# Patient Record
Sex: MALE | Race: OTHER | HISPANIC OR LATINO | Employment: OTHER | URBAN - METROPOLITAN AREA
[De-identification: names, ages, dates, MRNs, and addresses within clinical notes are randomized per-mention and may not be internally consistent; named-entity substitution may affect disease eponyms.]

---

## 2023-09-06 ENCOUNTER — ANESTHESIA EVENT (OUTPATIENT)
Dept: SURGERY | Facility: HOSPITAL | Age: 73
End: 2023-09-06

## 2023-09-06 ENCOUNTER — HOSPITAL ENCOUNTER (OUTPATIENT)
Facility: HOSPITAL | Age: 73
Discharge: HOME | End: 2023-09-09
Attending: EMERGENCY MEDICINE | Admitting: SURGERY

## 2023-09-06 DIAGNOSIS — K81.0 ACUTE CHOLECYSTITIS: Primary | ICD-10-CM

## 2023-09-06 DIAGNOSIS — R11.0 NAUSEA: ICD-10-CM

## 2023-09-06 DIAGNOSIS — R10.11 RIGHT UPPER QUADRANT ABDOMINAL PAIN: ICD-10-CM

## 2023-09-06 LAB
ALBUMIN SERPL-MCNC: 3.3 G/DL (ref 3.4–4.8)
ALBUMIN/GLOB SERPL: 0.9 RATIO (ref 1.1–2)
ALP SERPL-CCNC: 134 UNIT/L (ref 40–150)
ALT SERPL-CCNC: 22 UNIT/L (ref 0–55)
APPEARANCE UR: CLEAR
APTT PPP: 33.8 SECONDS (ref 23.4–33.9)
AST SERPL-CCNC: 23 UNIT/L (ref 5–34)
BACTERIA #/AREA URNS AUTO: NORMAL /HPF
BASOPHILS # BLD AUTO: 0.05 X10(3)/MCL
BASOPHILS NFR BLD AUTO: 0.2 %
BILIRUB SERPL-MCNC: 2.1 MG/DL
BILIRUB UR QL STRIP.AUTO: NEGATIVE
BUN SERPL-MCNC: 13.5 MG/DL (ref 8.4–25.7)
CALCIUM SERPL-MCNC: 9.1 MG/DL (ref 8.8–10)
CHLORIDE SERPL-SCNC: 94 MMOL/L (ref 98–107)
CO2 SERPL-SCNC: 28 MMOL/L (ref 23–31)
COLOR UR: YELLOW
CREAT SERPL-MCNC: 0.97 MG/DL (ref 0.73–1.18)
EOSINOPHIL # BLD AUTO: 0.01 X10(3)/MCL (ref 0–0.9)
EOSINOPHIL NFR BLD AUTO: 0 %
ERYTHROCYTE [DISTWIDTH] IN BLOOD BY AUTOMATED COUNT: 11.5 % (ref 11.5–17)
GFR SERPLBLD CREATININE-BSD FMLA CKD-EPI: >60 MLS/MIN/1.73/M2
GLOBULIN SER-MCNC: 3.7 GM/DL (ref 2.4–3.5)
GLUCOSE SERPL-MCNC: 223 MG/DL (ref 82–115)
GLUCOSE UR QL STRIP.AUTO: 100
GROUP & RH: NORMAL
HCT VFR BLD AUTO: 42.9 % (ref 42–52)
HGB BLD-MCNC: 15.4 G/DL (ref 14–18)
IMM GRANULOCYTES # BLD AUTO: 0.15 X10(3)/MCL (ref 0–0.04)
IMM GRANULOCYTES NFR BLD AUTO: 0.7 %
INDIRECT COOMBS GEL: NORMAL
INR PPP: 1.2 (ref 2–3)
KETONES UR QL STRIP.AUTO: 15
LACTATE SERPL-SCNC: 1.3 MMOL/L (ref 0.5–2.2)
LEUKOCYTE ESTERASE UR QL STRIP.AUTO: NEGATIVE
LIPASE SERPL-CCNC: 14 U/L
LYMPHOCYTES # BLD AUTO: 1.53 X10(3)/MCL (ref 0.6–4.6)
LYMPHOCYTES NFR BLD AUTO: 6.7 %
MCH RBC QN AUTO: 30.9 PG (ref 27–31)
MCHC RBC AUTO-ENTMCNC: 35.9 G/DL (ref 33–36)
MCV RBC AUTO: 86 FL (ref 80–94)
MONOCYTES # BLD AUTO: 1.99 X10(3)/MCL (ref 0.1–1.3)
MONOCYTES NFR BLD AUTO: 8.7 %
NEUTROPHILS # BLD AUTO: 19.07 X10(3)/MCL (ref 2.1–9.2)
NEUTROPHILS NFR BLD AUTO: 83.7 %
NITRITE UR QL STRIP.AUTO: NEGATIVE
NRBC BLD AUTO-RTO: 0 %
PH UR STRIP.AUTO: 5.5 [PH]
PLATELET # BLD AUTO: 292 X10(3)/MCL (ref 130–400)
PMV BLD AUTO: 9.9 FL (ref 7.4–10.4)
POCT GLUCOSE: 210 MG/DL (ref 70–110)
POCT GLUCOSE: 248 MG/DL (ref 70–110)
POCT GLUCOSE: 312 MG/DL (ref 70–110)
POTASSIUM SERPL-SCNC: 4.1 MMOL/L (ref 3.5–5.1)
PROT SERPL-MCNC: 7 GM/DL (ref 5.8–7.6)
PROT UR QL STRIP.AUTO: 30
PROTHROMBIN TIME: 15.1 SECONDS (ref 11.7–14.5)
RBC # BLD AUTO: 4.99 X10(6)/MCL (ref 4.7–6.1)
RBC #/AREA URNS AUTO: NORMAL /HPF
RBC UR QL AUTO: ABNORMAL
SODIUM SERPL-SCNC: 131 MMOL/L (ref 136–145)
SP GR UR STRIP.AUTO: <=1.005 (ref 1–1.03)
SPECIMEN OUTDATE: NORMAL
SQUAMOUS #/AREA URNS AUTO: NORMAL /HPF
TROPONIN I SERPL-MCNC: <0.01 NG/ML (ref 0–0.04)
UROBILINOGEN UR STRIP-ACNC: >=8
WBC # SPEC AUTO: 22.8 X10(3)/MCL (ref 4.5–11.5)
WBC #/AREA URNS AUTO: NORMAL /HPF

## 2023-09-06 PROCEDURE — 82962 GLUCOSE BLOOD TEST: CPT

## 2023-09-06 PROCEDURE — G0378 HOSPITAL OBSERVATION PER HR: HCPCS

## 2023-09-06 PROCEDURE — 93005 ELECTROCARDIOGRAM TRACING: CPT

## 2023-09-06 PROCEDURE — 12000002 HC ACUTE/MED SURGE SEMI-PRIVATE ROOM

## 2023-09-06 PROCEDURE — 96376 TX/PRO/DX INJ SAME DRUG ADON: CPT

## 2023-09-06 PROCEDURE — 25000003 PHARM REV CODE 250: Performed by: EMERGENCY MEDICINE

## 2023-09-06 PROCEDURE — 63600175 PHARM REV CODE 636 W HCPCS: Performed by: EMERGENCY MEDICINE

## 2023-09-06 PROCEDURE — 96372 THER/PROPH/DIAG INJ SC/IM: CPT | Mod: 59

## 2023-09-06 PROCEDURE — 63600175 PHARM REV CODE 636 W HCPCS

## 2023-09-06 PROCEDURE — 84484 ASSAY OF TROPONIN QUANT: CPT | Performed by: EMERGENCY MEDICINE

## 2023-09-06 PROCEDURE — 25000003 PHARM REV CODE 250

## 2023-09-06 PROCEDURE — 85610 PROTHROMBIN TIME: CPT | Performed by: EMERGENCY MEDICINE

## 2023-09-06 PROCEDURE — 99285 EMERGENCY DEPT VISIT HI MDM: CPT | Mod: 25

## 2023-09-06 PROCEDURE — 83690 ASSAY OF LIPASE: CPT | Performed by: EMERGENCY MEDICINE

## 2023-09-06 PROCEDURE — 93010 ELECTROCARDIOGRAM REPORT: CPT | Mod: ,,, | Performed by: INTERNAL MEDICINE

## 2023-09-06 PROCEDURE — 83605 ASSAY OF LACTIC ACID: CPT | Performed by: EMERGENCY MEDICINE

## 2023-09-06 PROCEDURE — 85025 COMPLETE CBC W/AUTO DIFF WBC: CPT | Performed by: EMERGENCY MEDICINE

## 2023-09-06 PROCEDURE — 96365 THER/PROPH/DIAG IV INF INIT: CPT

## 2023-09-06 PROCEDURE — 86900 BLOOD TYPING SEROLOGIC ABO: CPT | Performed by: SURGERY

## 2023-09-06 PROCEDURE — 96375 TX/PRO/DX INJ NEW DRUG ADDON: CPT

## 2023-09-06 PROCEDURE — 93010 PR ELECTROCARDIOGRAM REPORT: ICD-10-PCS | Mod: ,,, | Performed by: INTERNAL MEDICINE

## 2023-09-06 PROCEDURE — 81001 URINALYSIS AUTO W/SCOPE: CPT | Performed by: EMERGENCY MEDICINE

## 2023-09-06 PROCEDURE — 25500020 PHARM REV CODE 255: Performed by: EMERGENCY MEDICINE

## 2023-09-06 PROCEDURE — 87040 BLOOD CULTURE FOR BACTERIA: CPT | Performed by: EMERGENCY MEDICINE

## 2023-09-06 PROCEDURE — 80053 COMPREHEN METABOLIC PANEL: CPT | Performed by: EMERGENCY MEDICINE

## 2023-09-06 PROCEDURE — 85730 THROMBOPLASTIN TIME PARTIAL: CPT | Performed by: EMERGENCY MEDICINE

## 2023-09-06 RX ORDER — SODIUM CHLORIDE 0.9 % (FLUSH) 0.9 %
10 SYRINGE (ML) INJECTION
Status: DISCONTINUED | OUTPATIENT
Start: 2023-09-06 | End: 2023-09-09 | Stop reason: HOSPADM

## 2023-09-06 RX ORDER — TALC
6 POWDER (GRAM) TOPICAL NIGHTLY PRN
Status: DISCONTINUED | OUTPATIENT
Start: 2023-09-06 | End: 2023-09-09 | Stop reason: HOSPADM

## 2023-09-06 RX ORDER — ONDANSETRON 4 MG/1
8 TABLET, ORALLY DISINTEGRATING ORAL EVERY 8 HOURS PRN
Status: DISCONTINUED | OUTPATIENT
Start: 2023-09-06 | End: 2023-09-09 | Stop reason: HOSPADM

## 2023-09-06 RX ORDER — ONDANSETRON 2 MG/ML
4 INJECTION INTRAMUSCULAR; INTRAVENOUS EVERY 6 HOURS PRN
Status: DISCONTINUED | OUTPATIENT
Start: 2023-09-06 | End: 2023-09-06

## 2023-09-06 RX ORDER — LIDOCAINE HYDROCHLORIDE 10 MG/ML
1 INJECTION, SOLUTION EPIDURAL; INFILTRATION; INTRACAUDAL; PERINEURAL ONCE AS NEEDED
Status: DISCONTINUED | OUTPATIENT
Start: 2023-09-06 | End: 2023-09-09 | Stop reason: HOSPADM

## 2023-09-06 RX ORDER — HEPARIN SODIUM 5000 [USP'U]/ML
5000 INJECTION, SOLUTION INTRAVENOUS; SUBCUTANEOUS ONCE
Status: COMPLETED | OUTPATIENT
Start: 2023-09-07 | End: 2023-09-07

## 2023-09-06 RX ORDER — INSULIN ASPART 100 [IU]/ML
0-5 INJECTION, SOLUTION INTRAVENOUS; SUBCUTANEOUS
Status: DISCONTINUED | OUTPATIENT
Start: 2023-09-06 | End: 2023-09-07

## 2023-09-06 RX ORDER — SODIUM CHLORIDE, SODIUM LACTATE, POTASSIUM CHLORIDE, CALCIUM CHLORIDE 600; 310; 30; 20 MG/100ML; MG/100ML; MG/100ML; MG/100ML
INJECTION, SOLUTION INTRAVENOUS CONTINUOUS
Status: DISCONTINUED | OUTPATIENT
Start: 2023-09-06 | End: 2023-09-07

## 2023-09-06 RX ORDER — MORPHINE SULFATE 4 MG/ML
4 INJECTION, SOLUTION INTRAMUSCULAR; INTRAVENOUS
Status: COMPLETED | OUTPATIENT
Start: 2023-09-06 | End: 2023-09-06

## 2023-09-06 RX ORDER — OXYCODONE HYDROCHLORIDE 5 MG/1
5 TABLET ORAL EVERY 4 HOURS PRN
Status: CANCELLED | OUTPATIENT
Start: 2023-09-06

## 2023-09-06 RX ORDER — ACETAMINOPHEN 325 MG/1
650 TABLET ORAL EVERY 8 HOURS PRN
Status: DISCONTINUED | OUTPATIENT
Start: 2023-09-06 | End: 2023-09-07

## 2023-09-06 RX ORDER — IBUPROFEN 200 MG
16 TABLET ORAL
Status: DISCONTINUED | OUTPATIENT
Start: 2023-09-06 | End: 2023-09-09 | Stop reason: HOSPADM

## 2023-09-06 RX ORDER — HYDROCODONE BITARTRATE AND ACETAMINOPHEN 5; 325 MG/1; MG/1
1 TABLET ORAL
Status: CANCELLED | OUTPATIENT
Start: 2023-09-06

## 2023-09-06 RX ORDER — OXYCODONE HYDROCHLORIDE 10 MG/1
10 TABLET ORAL EVERY 4 HOURS PRN
Status: DISCONTINUED | OUTPATIENT
Start: 2023-09-06 | End: 2023-09-09 | Stop reason: HOSPADM

## 2023-09-06 RX ORDER — ACETAMINOPHEN 325 MG/1
650 TABLET ORAL EVERY 4 HOURS PRN
Status: DISCONTINUED | OUTPATIENT
Start: 2023-09-06 | End: 2023-09-07

## 2023-09-06 RX ORDER — SODIUM CHLORIDE, SODIUM LACTATE, POTASSIUM CHLORIDE, CALCIUM CHLORIDE 600; 310; 30; 20 MG/100ML; MG/100ML; MG/100ML; MG/100ML
INJECTION, SOLUTION INTRAVENOUS CONTINUOUS
Status: CANCELLED | OUTPATIENT
Start: 2023-09-06

## 2023-09-06 RX ORDER — ENOXAPARIN SODIUM 100 MG/ML
40 INJECTION SUBCUTANEOUS EVERY 24 HOURS
Status: DISCONTINUED | OUTPATIENT
Start: 2023-09-06 | End: 2023-09-09 | Stop reason: HOSPADM

## 2023-09-06 RX ORDER — MORPHINE SULFATE 4 MG/ML
2 INJECTION, SOLUTION INTRAMUSCULAR; INTRAVENOUS
Status: COMPLETED | OUTPATIENT
Start: 2023-09-06 | End: 2023-09-06

## 2023-09-06 RX ORDER — IBUPROFEN 200 MG
24 TABLET ORAL
Status: DISCONTINUED | OUTPATIENT
Start: 2023-09-06 | End: 2023-09-09 | Stop reason: HOSPADM

## 2023-09-06 RX ORDER — SODIUM CHLORIDE, SODIUM GLUCONATE, SODIUM ACETATE, POTASSIUM CHLORIDE AND MAGNESIUM CHLORIDE 30; 37; 368; 526; 502 MG/100ML; MG/100ML; MG/100ML; MG/100ML; MG/100ML
INJECTION, SOLUTION INTRAVENOUS CONTINUOUS
Status: CANCELLED | OUTPATIENT
Start: 2023-09-06 | End: 2023-10-06

## 2023-09-06 RX ORDER — ONDANSETRON 2 MG/ML
4 INJECTION INTRAMUSCULAR; INTRAVENOUS
Status: COMPLETED | OUTPATIENT
Start: 2023-09-06 | End: 2023-09-06

## 2023-09-06 RX ORDER — GLUCAGON 1 MG
1 KIT INJECTION
Status: DISCONTINUED | OUTPATIENT
Start: 2023-09-06 | End: 2023-09-09 | Stop reason: HOSPADM

## 2023-09-06 RX ORDER — OXYCODONE HYDROCHLORIDE 5 MG/1
5 TABLET ORAL EVERY 4 HOURS PRN
Status: DISCONTINUED | OUTPATIENT
Start: 2023-09-06 | End: 2023-09-09 | Stop reason: HOSPADM

## 2023-09-06 RX ORDER — MIDAZOLAM HYDROCHLORIDE 1 MG/ML
2 INJECTION INTRAMUSCULAR; INTRAVENOUS ONCE AS NEEDED
Status: CANCELLED | OUTPATIENT
Start: 2023-09-06 | End: 2035-02-02

## 2023-09-06 RX ORDER — HYDROMORPHONE HYDROCHLORIDE 2 MG/ML
0.2 INJECTION, SOLUTION INTRAMUSCULAR; INTRAVENOUS; SUBCUTANEOUS EVERY 6 HOURS PRN
Status: DISCONTINUED | OUTPATIENT
Start: 2023-09-06 | End: 2023-09-09 | Stop reason: HOSPADM

## 2023-09-06 RX ADMIN — ENOXAPARIN SODIUM 40 MG: 40 INJECTION SUBCUTANEOUS at 07:09

## 2023-09-06 RX ADMIN — PIPERACILLIN AND TAZOBACTAM 4.5 G: 4; .5 INJECTION, POWDER, LYOPHILIZED, FOR SOLUTION INTRAVENOUS; PARENTERAL at 09:09

## 2023-09-06 RX ADMIN — INSULIN ASPART 4 UNITS: 100 INJECTION, SOLUTION INTRAVENOUS; SUBCUTANEOUS at 07:09

## 2023-09-06 RX ADMIN — ONDANSETRON 4 MG: 2 INJECTION INTRAMUSCULAR; INTRAVENOUS at 05:09

## 2023-09-06 RX ADMIN — PIPERACILLIN AND TAZOBACTAM 4.5 G: 4; .5 INJECTION, POWDER, LYOPHILIZED, FOR SOLUTION INTRAVENOUS; PARENTERAL at 01:09

## 2023-09-06 RX ADMIN — MORPHINE SULFATE 4 MG: 4 INJECTION, SOLUTION INTRAMUSCULAR; INTRAVENOUS at 05:09

## 2023-09-06 RX ADMIN — OXYCODONE HYDROCHLORIDE 5 MG: 5 TABLET ORAL at 04:09

## 2023-09-06 RX ADMIN — INSULIN ASPART 2 UNITS: 100 INJECTION, SOLUTION INTRAVENOUS; SUBCUTANEOUS at 12:09

## 2023-09-06 RX ADMIN — DEXTROSE MONOHYDRATE 1 G: 5 INJECTION INTRAVENOUS at 04:09

## 2023-09-06 RX ADMIN — MORPHINE SULFATE 4 MG: 4 INJECTION, SOLUTION INTRAMUSCULAR; INTRAVENOUS at 02:09

## 2023-09-06 RX ADMIN — IOPAMIDOL 100 ML: 755 INJECTION, SOLUTION INTRAVENOUS at 03:09

## 2023-09-06 RX ADMIN — MORPHINE SULFATE 2 MG: 4 INJECTION INTRAVENOUS at 11:09

## 2023-09-06 RX ADMIN — SODIUM CHLORIDE, POTASSIUM CHLORIDE, SODIUM LACTATE AND CALCIUM CHLORIDE: 600; 310; 30; 20 INJECTION, SOLUTION INTRAVENOUS at 12:09

## 2023-09-06 RX ADMIN — ONDANSETRON 4 MG: 2 INJECTION INTRAMUSCULAR; INTRAVENOUS at 02:09

## 2023-09-06 NOTE — ANESTHESIA PREPROCEDURE EVALUATION
"                                                                                                             09/06/2023  Kishore Anderson is a 73 y.o., male [place] speaking admitted through ER for lap clinton due to acute cholecystitis  "Chief Complaint/Reason for Admission: Acute cholecystitis     History of Present Illness:  Mr. Kishore Anderson is a 73 y.o. male with PMHx of DM on insulin who presents with abdominal pain as a transfer from [facility]. Reports that he had abdominal pain 9/2 evening which resolved on its own but returned last night. Pain initially worsened but improved after pain medicine in ED. Denies fevers but reports chills. No n/v/d/c. Never had this pain before.      Hx of DM with hx of laser ablation of prostate. No alc or cigarette use. No hx MI or CVA.     In ED, pt is AFVSS. WBC 22. Na 131. Glucose 223. Tbili 2.1. CT showing acute cholecystitis. US showed biliary sludge   "    Pt does speak some english well enough to communicate  Had prostate surgery about 5 years ago - no anesthesia issues per pt  Pre-op Assessment    I have reviewed the NPO Status.      Review of Systems     Latest Reference Range & Units 09/07/23 03:58   WBC 4.50 - 11.50 x10(3)/mcL 27.90 (H)   Hemoglobin 14.0 - 18.0 g/dL 13.7 (L)   Hematocrit 42.0 - 52.0 % 38.6 (L)   Platelets 130 - 400 x10(3)/mcL 278   Sodium 136 - 145 mmol/L 124 (L)   Potassium 3.5 - 5.1 mmol/L 4.6   Chloride 98 - 107 mmol/L 91 (L)   CO2 23 - 31 mmol/L 24   BUN 8.4 - 25.7 mg/dL 19.5   Creatinine 0.73 - 1.18 mg/dL 1.09   eGFR mls/min/1.73/m2 >60   Glucose 82 - 115 mg/dL 290 (H)   Calcium 8.8 - 10.0 mg/dL 8.3 (L)   Phosphorus 2.3 - 4.7 mg/dL 2.8   Magnesium  1.60 - 2.60 mg/dL 1.80   Alkaline Phosphatase 40 - 150 unit/L 185 (H)   PROTEIN TOTAL 5.8 - 7.6 gm/dL 5.7 (L)   Albumin 3.4 - 4.8 g/dL 2.5 (L)   Albumin/Globulin Ratio 1.1 - 2.0 ratio 0.8 (L)   BILIRUBIN TOTAL <=1.5 mg/dL 3.2 (H)   AST 5 - 34 unit/L 30   ALT 0 - 55 unit/L 24   (H): Data is abnormally " high  (L): Data is abnormally low    Physical Exam  General: Well nourished, Cooperative, Alert and Oriented  No complaints of pain or nausea  Airway:  Mallampati: II   Mouth Opening: Normal  TM Distance: Normal  Tongue: Normal  Neck ROM: Normal ROM    Dental:  Periodontal disease  Poor dentition  Per pt top front teeth are loose but they appear bonded together  Chest/Lungs:  Clear to auscultation, Normal Respiratory Rate    Heart:  Rate: Normal  Rhythm: Regular Rhythm        Anesthesia Plan  Type of Anesthesia, risks & benefits discussed:    Anesthesia Type: Gen ETT  Intra-op Monitoring Plan: Standard ASA Monitors  Post Op Pain Control Plan: IV/PO Opioids PRN and multimodal analgesia  Induction:  IV  Airway Plan: Direct  Informed Consent: Informed consent signed with the Patient and all parties understand the risks and agree with anesthesia plan.  All questions answered. Patient consented to blood products? No  ASA Score: 3  Day of Surgery Review of History & Physical: H&P Update referred to the surgeon/provider.  Anesthesia Plan Notes: Premedication with Midazolam  Technique: GETA   Ketamine on induction - ofirmev and toradol if none in last 4-6 hours   PONV Prophylaxis   PACU Postop       Ready For Surgery From Anesthesia Perspective.     .

## 2023-09-06 NOTE — ED PROVIDER NOTES
Encounter Date: 9/6/2023       History     Chief Complaint   Patient presents with    Abdominal Pain    Nausea     Patient is a 72 yo M presenting with abdominal pain and nausea. Symptoms started 3 days ago, intermittent, worse with eating. Became worse today. Pain did radiate to the back earlier, currently a 9/10 in severity. Never really had this before. Described as sharp. No fevers or vomiting. No diarrhea or constipation. No leg swelling or rashes. No hx of abdominal surgery. He still has his gallbladder. He is from [facility] and is visiting his family.         Review of patient's allergies indicates:  No Known Allergies  No past medical history on file. HTN  No past surgical history on file.  No family history on file.     Review of Systems   Constitutional:  Negative for fever.   HENT:  Negative for sore throat.    Respiratory:  Negative for shortness of breath.    Cardiovascular:  Negative for chest pain.   Gastrointestinal:  Positive for abdominal pain and nausea. Negative for constipation, diarrhea and vomiting.   Genitourinary:  Negative for dysuria.   Musculoskeletal:  Negative for back pain.   Skin:  Negative for rash.   Neurological:  Negative for weakness.   Hematological:  Does not bruise/bleed easily.       Physical Exam     Initial Vitals [09/06/23 0223]   BP Pulse Resp Temp SpO2   135/85 97 18 98.1 °F (36.7 °C) 96 %      MAP       --         Physical Exam    Nursing note and vitals reviewed.  Constitutional: He appears well-developed and well-nourished. He is not diaphoretic. No distress.   HENT:   Head: Normocephalic and atraumatic.   Eyes: Conjunctivae are normal.   Neck: Neck supple.   Cardiovascular:  Normal rate, regular rhythm and normal heart sounds.           Pulmonary/Chest: Breath sounds normal. No respiratory distress. He has no wheezes. He has no rhonchi.   Abdominal: Abdomen is soft. Bowel sounds are normal. He exhibits no distension. There is abdominal tenderness (diffuse, but best  localized to the mid and RUQ). There is guarding. There is no rebound.   Musculoskeletal:         General: No edema. Normal range of motion.      Cervical back: Neck supple.     Neurological: He is alert and oriented to person, place, and time.   Skin: Skin is warm and dry.   Psychiatric: He has a normal mood and affect. Thought content normal.         ED Course   Procedures  Labs Reviewed   COMPREHENSIVE METABOLIC PANEL - Abnormal; Notable for the following components:       Result Value    Sodium Level 131 (*)     Chloride 94 (*)     Glucose Level 223 (*)     Albumin Level 3.3 (*)     Globulin 3.7 (*)     Albumin/Globulin Ratio 0.9 (*)     Bilirubin Total 2.1 (*)     All other components within normal limits   URINALYSIS, REFLEX TO URINE CULTURE - Abnormal; Notable for the following components:    Protein, UA 30 (*)     Glucose,  (*)     Ketones, UA 15 (*)     Blood, UA Trace (*)     Urobilinogen, UA >=8.0 (*)     All other components within normal limits   PROTIME-INR - Abnormal; Notable for the following components:    PT 15.1 (*)     INR 1.2 (*)     All other components within normal limits   CBC WITH DIFFERENTIAL - Abnormal; Notable for the following components:    WBC 22.80 (*)     Neut # 19.07 (*)     Mono # 1.99 (*)     IG# 0.15 (*)     All other components within normal limits   POCT GLUCOSE - Abnormal; Notable for the following components:    POCT Glucose 210 (*)     All other components within normal limits   POCT GLUCOSE - Abnormal; Notable for the following components:    POCT Glucose 248 (*)     All other components within normal limits   POCT GLUCOSE - Abnormal; Notable for the following components:    POCT Glucose 312 (*)     All other components within normal limits   LIPASE - Normal   LACTIC ACID, PLASMA - Normal   TROPONIN I - Normal   APTT - Normal   URINALYSIS, MICROSCOPIC - Normal   CBC W/ AUTO DIFFERENTIAL    Narrative:     The following orders were created for panel order CBC W/ AUTO  DIFFERENTIAL.  Procedure                               Abnormality         Status                     ---------                               -----------         ------                     CBC with Differential[[id number]]       Abnormal            Final result                 Please view results for these tests on the individual orders.   TYPE & SCREEN   POCT GLUCOSE MONITORING CONTINUOUS     EKG Readings: (Independently Interpreted)   EKG Interpretation 2:48 AM    Rate: 91  Rhythm: NSR  QRS: WNL  Qtc: WNL  Q waves in lead III. No stemi. No priors available. (Patient is from [facility])           Imaging Results              US Abdomen Limited_Gallbladder (Final result)  Result time 09/06/23 07:04:18      Final result by Luis Fernando Hoyt MD (09/06/23 07:04:18)                   Impression:      As above.  Findings concerning for acute cholecystitis.      Electronically signed by: Luis Fernando Hoyt  Date:    09/06/2023  Time:    07:04               Narrative:    EXAMINATION:  US ABDOMEN LIMITED_GALLBLADDER    CLINICAL HISTORY:  abnormal CT, elevated bilirubin;    TECHNIQUE:  Limited abdominal Ultrasound Images obtained in grayscale and color.    COMPARISON:  None    FINDINGS:  Sludge is noted within the distended gallbladder.  The gallbladder wall is prominent.  There is trace pericholecystic fluid.  Findings concerning for acute cholecystitis.                                       CT Abdomen Pelvis With Contrast (Final result)  Result time 09/06/23 07:30:21      Final result by Dinesh Ladd MD (09/06/23 07:30:21)                   Impression:      1. Gallbladder distension with pericholecystic inflammatory stranding suspicious for acute cholecystitis.  2. Trace ascites.  3. Trace right pleural effusion and bibasilar atelectasis.  [employer] concordance      Electronically signed by: Dinesh Ladd MD  Date:    09/06/2023  Time:    07:30               Narrative:    EXAMINATION:  CT ABDOMEN PELVIS WITH CONTRAST    CLINICAL  HISTORY:  Abdominal pain, acute, nonlocalized;    TECHNIQUE:  Axial images of the abdomen and pelvis were obtained with IV contrast administration.  Coronal and sagittal reconstructions were provided.  Three dimensional and MIP images were obtained and evaluated.  Total DLP was 391 mGy-cm. Dose lowering technique and automated exposure control were utilized for this exam.    COMPARISON:  None    FINDINGS:  There is a trace right pleural effusion with bilateral lower lobe atelectasis.  The heart is not enlarged.    The liver is homogeneous in attenuation.  The portal vein is patent.  The gallbladder is distended.  There is mild pericholecystic inflammatory stranding.  No cholelithiasis is visualized on this exam.    The spleen, pancreas, and adrenal glands are normal.  There are simple renal cysts bilaterally.  There is no hydronephrosis or nephrolithiasis.    The stomach and small bowel are decompressed.  The appendix is normal.  The colon is normal.  There is trace ascites.  The urinary bladder is normal.  There is no pelvic or retroperitoneal lymphadenopathy.  The aorta is nonaneurysmal.  There is no lytic or blastic osseous lesion.                        Preliminary result by Dinesh Ladd MD (09/06/23 03:59:16)                   Narrative:    START OF REPORT:  TECHNIQUE: CT OF THE ABDOMEN AND PELVIS WAS PERFORMED WITH AXIAL IMAGES AS WELL AS SAGITTAL AND CORONAL RECONSTRUCTION IMAGES WITH INTRAVENOUS CONTRAST.    COMPARISON: NONE AVAILABLE.    CLINICAL HISTORY: ABDOMINAL PAIN.    DOSAGE INFORMATION: AUTOMATED EXPOSURE CONTROL WAS UTILIZED.    Findings:  Thorax: The diaphragm is slightly elevated on the right.  Lungs: Streaky opacities are noted in bilateral lower lobes, which may reflect subsegmental atelectasis and / or parenchymal scarring.  Pleura: Trace right pleural effusion is seen.  Heart: The heart size is within normal limits.  Abdomen:  Abdominal Wall: No abdominal wall pathology is seen.  Liver: The  liver appears unremarkable.  Biliary System: No intrahepatic or extrahepatic biliary duct dilatation is seen.  Gallbladder: The gallbladder is moderately distended with pericholecystic fat stranding. No radiodense gallstone is seen. In the appropriate clinical setting, this may reflect acute cholecystitis. There is mild wall thickening of the hepatic flexure of the colon, adjacent to the gallbladder (series 4, images 16-20), likely from contiguous inflammation.  Pancreas: Moderate pancreatic atrophy is seen. The pancreas otherwise appears unremarkable.  Spleen: The spleen appears unremarkable.  Adrenals: The adrenal glands appear unremarkable.  Kidneys: A single cyst measuring 1.5 cm is seen on series 2, image 41 in the mid pole of the left kidney. The left kidney otherwise appears unremarkable with no stones masses or hydronephrosis identified. A single cyst measuring 1.2 cm is seen on series 2, image 43 in the upper pole of the right kidney. The right kidney otherwise appears unremarkable with no stones masses or hydronephrosis identified.  Aorta: The abdominal aorta appears unremarkable.  IVC: Unremarkable.  Bowel:  Esophagus: The visualized esophagus appears unremarkable.  Stomach: The stomach appears unremarkable.  Duodenum: A 2.1 x 2 cm diverticulum is seen arising from the junction of the 2nd and 3rd portions of the duodenum (series 2, image 44).  Small Bowel: The small bowel appears unremarkable.  Appendix: The appendix appears unremarkable.  Peritoneum: No free intraperitoneal air is seen. There is minimal ascites.    Pelvis:  Bladder: The bladder is nondistended but appears otherwise unremarkable.  Male:  Prostate gland: The prostate gland is mildly enlarged with its median lobe projecting into the bladder base.    Bony structures:  Dorsal Spine: There is mild spondylosis of the visualized dorsal spine.  Bony Pelvis: The visualized bony structures of the pelvis appear unremarkable.      Impression:  1. The  gallbladder is moderately distended with pericholecystic fat stranding. No radiodense gallstone is seen. In the appropriate clinical setting, this may reflect acute cholecystitis. Ultrasound may be helpful for more definitive characterization. There is mild wall thickening of the hepatic flexure of the colon, adjacent to the gallbladder (series 4, images 16-20), likely from contiguous inflammation.  2. There is minimal ascites.  3. Trace right pleural effusion is seen.  4. Details and other findings as discussed above.                                         Medications   LIDOcaine (PF) 10 mg/ml (1%) injection 10 mg (has no administration in time range)   sodium chloride 0.9% flush 10 mL (has no administration in time range)   ondansetron disintegrating tablet 8 mg (has no administration in time range)   melatonin tablet 6 mg (has no administration in time range)   enoxaparin injection 40 mg (40 mg Subcutaneous Given 9/7/23 1728)   piperacillin-tazobactam (ZOSYN) 4.5 g in dextrose 5 % in water (D5W) 100 mL IVPB (MB+) (0 g Intravenous Stopped 9/8/23 0251)   oxyCODONE immediate release tablet 5 mg (5 mg Oral Given 9/8/23 0011)   oxyCODONE immediate release tablet Tab 10 mg (has no administration in time range)   HYDROmorphone (PF) injection 0.2 mg (0.2 mg Intravenous Given 9/7/23 0315)   glucose chewable tablet 16 g (has no administration in time range)   glucose chewable tablet 24 g (has no administration in time range)   glucagon (human recombinant) injection 1 mg (has no administration in time range)   dextrose 10% bolus 125 mL 125 mL (has no administration in time range)   dextrose 10% bolus 250 mL 250 mL (has no administration in time range)   sodium chloride oral tablet 1,000 mg (1,000 mg Oral Given 9/7/23 2113)   heparin (porcine) 5,000 unit/mL injection (has no administration in time range)   acetaminophen tablet 650 mg (650 mg Oral Given 9/8/23 0011)   0.9%  NaCl infusion ( Intravenous New Bag 9/7/23 1157)    glucagon (human recombinant) injection 1 mg (has no administration in time range)   insulin aspart U-100 injection 0-10 Units (2 Units Subcutaneous Given 9/7/23 2113)   dextrose 10% bolus 125 mL 125 mL (has no administration in time range)   dextrose 10% bolus 250 mL 250 mL (has no administration in time range)   sodium phosphate 20.01 mmol in dextrose 5 % (D5W) 250 mL IVPB (has no administration in time range)   morphine injection 4 mg (4 mg Intravenous Given 9/6/23 0256)   ondansetron injection 4 mg (4 mg Intravenous Given 9/6/23 0257)   iopamidoL (ISOVUE-370) injection 100 mL (100 mLs Intravenous Given 9/6/23 0359)   cefTRIAXone (ROCEPHIN) 1 g in dextrose 5 % in water (D5W) 100 mL IVPB (MB+) (0 g Intravenous Stopped 9/6/23 0507)   morphine injection 4 mg (4 mg Intravenous Given 9/6/23 0521)   ondansetron injection 4 mg (4 mg Intravenous Given 9/6/23 0521)   morphine injection 2 mg (2 mg Intravenous Given 9/6/23 1113)   heparin (porcine) injection 5,000 Units (5,000 Units Subcutaneous Given 9/7/23 0908)     Medical Decision Making  Patient is a 74 yo M presenting with upper abdominal pain x 3 days with nausea and decreased appetite. See HPI for details. Differentials considered but not limited to GERD, intestinal spasm, gastroenteritis, gastritis, ulcer, cholecystitis, cholelithiasis, gallstones, pancreatitis, ileus, small bowel obstruction, appendicitis, diverticulitis, colitis, constipation, intestinal gas pain, aortic dissection, vascular occulusion      Problems Addressed:  Nausea: acute illness or injury  Right upper quadrant abdominal pain: acute illness or injury    Amount and/or Complexity of Data Reviewed  Labs: ordered. Decision-making details documented in ED Course.  Radiology: ordered. Decision-making details documented in ED Course.    Risk  Prescription drug management.  Decision regarding hospitalization.  Minor surgery with identified risk factors.  Risk Details: Suspected cholecystitis in  elderly gentleman with leukocytosis. Risks of worsening infection, sepsis, end organ damage. Plan to transfer ED to ED for General surgery evaluation.                ED Course as of 09/08/23 0550   Wed Sep 06, 2023   0425 CT suspicious for gallbladder pathology. US ordered. Patient informed of results. Ceftriaxone ordered.  [GM]   0730 Patient turned over to me at 0700 - US report still pending.  Radiologist reports concern for cholecystitis.  Will consult general surgery and transfer to [facility] ED for definitive care. [CL]   7526 I spoke with Dr. Lewis (general surgery) who states they will evaluate patient once he arrives to [facility] ED.  I spoke with Dr. Garcia ([facility] ED), who accepts patient. [CL]      ED Course User Index  [CL] Cirilo Dupont MD  [GM] Damaris Anna MD                    Clinical Impression:   Final diagnoses:  [R11.0] Nausea  [R10.11] Right upper quadrant abdominal pain (Primary)  [K81.0] Acute cholecystitis        ED Disposition Condition    Admit                 Damaris Anna MD  09/08/23 0536

## 2023-09-06 NOTE — H&P
HISTORY & PHYSICAL  Trauma and Acute Care Surgery    Patient Name: Kishore Anderson  YOB: 1950    Date: 09/06/2023                     PRESENTING HISTORY     Chief Complaint/Reason for Admission: Acute cholecystitis    History of Present Illness:  Mr. Kishore Anderson is a 73 y.o. male with PMHx of DM on insulin who presents with abdominal pain as a transfer from [facility]. Reports that he had abdominal pain 9/2 evening which resolved on its own but returned last night. Pain initially worsened but improved after pain medicine in ED. Denies fevers but reports chills. No n/v/d/c. Never had this pain before.     Hx of DM with hx of laser ablation of prostate. No alc or cigarette use. No hx MI or CVA.    In ED, pt is AFVSS. WBC 22. Na 131. Glucose 223. Tbili 2.1. CT showing acute cholecystitis. US showed biliary sludge    Review of Systems:  12 point ROS negative except as stated in HPI    PAST HISTORY:     No past medical history on file.    No past surgical history on file.    No family history on file.    Social History     Socioeconomic History    Marital status:        MEDICATIONS & ALLERGIES:     No current facility-administered medications on file prior to encounter.     No current outpatient medications on file prior to encounter.       Review of patient's allergies indicates:  No Known Allergies    OBJECTIVE:     Vital Signs:  Temp:  [98.1 °F (36.7 °C)-99.7 °F (37.6 °C)] 98.2 °F (36.8 °C)  Pulse:  [] 98  Resp:  [18-20] 20  SpO2:  [93 %-98 %] 94 %  BP: (129-148)/(67-85) 133/67  Body mass index is 25.5 kg/m².     Physical Exam:  General:  Well developed, well nourished, no acute distress  HEENT:  Normocephalic, atraumatic  CVS:  RRR  Resp:  no increased WOB  GI:  Abdomen soft, non-distended, tender to RUQ, no guarding  :  Deferred  MSK:  No muscle atrophy, cyanosis, peripheral edema, full range of motion  Skin:  No rashes, ulcers, erythema  Neuro:  CNII-XII grossly intact  Psych:  Alert  "and oriented to person, place, and time    Laboratory  Troponin:  Recent Labs     09/06/23  0249   TROPONINI <0.010     CBC:  Recent Labs     09/06/23  0249   WBC 22.80*   RBC 4.99   HGB 15.4   HCT 42.9      MCV 86.0   MCH 30.9   MCHC 35.9     CMP:  Recent Labs     09/06/23 0249   CALCIUM 9.1   ALBUMIN 3.3*   *   K 4.1   CO2 28   BUN 13.5   CREATININE 0.97   ALKPHOS 134   ALT 22   AST 23   BILITOT 2.1*     Lactic Acid:  Invalid input(s): "LACTATIC"  Etoh:  No results for input(s): "ALCOHOLMEDIC" in the last 72 hours.  Drug Screen:  No results for input(s): "PCDSCOMETHA", "COCAINEMETAB", "OPIATESCREEN", "BARBITURATES", "AMPHETAMINES", "MARIJUANATHC", "PCDSOPHENCYN", "CREATRANDUR", "TOXINFO" in the last 72 hours.      ABG:  No results for input(s): "PH", "PCO2", "PO2", "HCO3", "BE", "POCSATURATED" in the last 72 hours.    Diagnostic Results:  Imaging Results              US Abdomen Limited_Gallbladder (Final result)  Result time 09/06/23 07:04:18      Final result by Luis Fernando Hoyt MD (09/06/23 07:04:18)                   Impression:      As above.  Findings concerning for acute cholecystitis.      Electronically signed by: Luis Fernando Hoyt  Date:    09/06/2023  Time:    07:04               Narrative:    EXAMINATION:  US ABDOMEN LIMITED_GALLBLADDER    CLINICAL HISTORY:  abnormal CT, elevated bilirubin;    TECHNIQUE:  Limited abdominal Ultrasound Images obtained in grayscale and color.    COMPARISON:  None    FINDINGS:  Sludge is noted within the distended gallbladder.  The gallbladder wall is prominent.  There is trace pericholecystic fluid.  Findings concerning for acute cholecystitis.                                       CT Abdomen Pelvis With Contrast (Final result)  Result time 09/06/23 07:30:21      Final result by Dinesh Ladd MD (09/06/23 07:30:21)                   Impression:      1. Gallbladder distension with pericholecystic inflammatory stranding suspicious for acute cholecystitis.  2. " Trace ascites.  3. Trace right pleural effusion and bibasilar atelectasis.  [employer] concordance      Electronically signed by: Dinesh Ladd MD  Date:    09/06/2023  Time:    07:30               Narrative:    EXAMINATION:  CT ABDOMEN PELVIS WITH CONTRAST    CLINICAL HISTORY:  Abdominal pain, acute, nonlocalized;    TECHNIQUE:  Axial images of the abdomen and pelvis were obtained with IV contrast administration.  Coronal and sagittal reconstructions were provided.  Three dimensional and MIP images were obtained and evaluated.  Total DLP was 391 mGy-cm. Dose lowering technique and automated exposure control were utilized for this exam.    COMPARISON:  None    FINDINGS:  There is a trace right pleural effusion with bilateral lower lobe atelectasis.  The heart is not enlarged.    The liver is homogeneous in attenuation.  The portal vein is patent.  The gallbladder is distended.  There is mild pericholecystic inflammatory stranding.  No cholelithiasis is visualized on this exam.    The spleen, pancreas, and adrenal glands are normal.  There are simple renal cysts bilaterally.  There is no hydronephrosis or nephrolithiasis.    The stomach and small bowel are decompressed.  The appendix is normal.  The colon is normal.  There is trace ascites.  The urinary bladder is normal.  There is no pelvic or retroperitoneal lymphadenopathy.  The aorta is nonaneurysmal.  There is no lytic or blastic osseous lesion.                                        ASSESSMENT & PLAN:   Mr. Kishore Anderson is a 73 y.o. male PMHx of DM on insulin presents with acute cholecystitis    Plan:  - admit to general surgery  - plan for lap clinton 9/7. Consented. Risks and benefits discussed  - regular diet. NPO mn  - lvnx. Please give heparin on call to OR  - zosyn  - ok to ambulate  - on LR 75      Celena Salgado MD  Trauma/Acute Care Surgery   c - [phone]    9/6/2023  11:50 AM

## 2023-09-07 ENCOUNTER — ANESTHESIA (OUTPATIENT)
Dept: SURGERY | Facility: HOSPITAL | Age: 73
End: 2023-09-07

## 2023-09-07 LAB
ALBUMIN SERPL-MCNC: 2.5 G/DL (ref 3.4–4.8)
ALBUMIN/GLOB SERPL: 0.8 RATIO (ref 1.1–2)
ALP SERPL-CCNC: 185 UNIT/L (ref 40–150)
ALT SERPL-CCNC: 24 UNIT/L (ref 0–55)
AST SERPL-CCNC: 30 UNIT/L (ref 5–34)
BASOPHILS # BLD AUTO: 0.05 X10(3)/MCL
BASOPHILS NFR BLD AUTO: 0.2 %
BILIRUB SERPL-MCNC: 3.2 MG/DL
BUN SERPL-MCNC: 19.5 MG/DL (ref 8.4–25.7)
CALCIUM SERPL-MCNC: 8.3 MG/DL (ref 8.8–10)
CHLORIDE SERPL-SCNC: 91 MMOL/L (ref 98–107)
CO2 SERPL-SCNC: 24 MMOL/L (ref 23–31)
CREAT SERPL-MCNC: 1.09 MG/DL (ref 0.73–1.18)
EOSINOPHIL # BLD AUTO: 0.01 X10(3)/MCL (ref 0–0.9)
EOSINOPHIL NFR BLD AUTO: 0 %
ERYTHROCYTE [DISTWIDTH] IN BLOOD BY AUTOMATED COUNT: 11.8 % (ref 11.5–17)
GFR SERPLBLD CREATININE-BSD FMLA CKD-EPI: >60 MLS/MIN/1.73/M2
GLOBULIN SER-MCNC: 3.2 GM/DL (ref 2.4–3.5)
GLUCOSE SERPL-MCNC: 290 MG/DL (ref 82–115)
HCT VFR BLD AUTO: 38.6 % (ref 42–52)
HGB BLD-MCNC: 13.7 G/DL (ref 14–18)
IMM GRANULOCYTES # BLD AUTO: 0.3 X10(3)/MCL (ref 0–0.04)
IMM GRANULOCYTES NFR BLD AUTO: 1.1 %
LYMPHOCYTES # BLD AUTO: 1.02 X10(3)/MCL (ref 0.6–4.6)
LYMPHOCYTES NFR BLD AUTO: 3.7 %
MAGNESIUM SERPL-MCNC: 1.8 MG/DL (ref 1.6–2.6)
MCH RBC QN AUTO: 30.3 PG (ref 27–31)
MCHC RBC AUTO-ENTMCNC: 35.5 G/DL (ref 33–36)
MCV RBC AUTO: 85.4 FL (ref 80–94)
MONOCYTES # BLD AUTO: 1.9 X10(3)/MCL (ref 0.1–1.3)
MONOCYTES NFR BLD AUTO: 6.8 %
NEUTROPHILS # BLD AUTO: 24.62 X10(3)/MCL (ref 2.1–9.2)
NEUTROPHILS NFR BLD AUTO: 88.2 %
NRBC BLD AUTO-RTO: 0 %
PHOSPHATE SERPL-MCNC: 2.8 MG/DL (ref 2.3–4.7)
PLATELET # BLD AUTO: 278 X10(3)/MCL (ref 130–400)
PMV BLD AUTO: 9.9 FL (ref 7.4–10.4)
POCT GLUCOSE: 246 MG/DL (ref 70–110)
POCT GLUCOSE: 283 MG/DL (ref 70–110)
POCT GLUCOSE: 287 MG/DL (ref 70–110)
POCT GLUCOSE: 296 MG/DL (ref 70–110)
POTASSIUM SERPL-SCNC: 4.6 MMOL/L (ref 3.5–5.1)
PROT SERPL-MCNC: 5.7 GM/DL (ref 5.8–7.6)
RBC # BLD AUTO: 4.52 X10(6)/MCL (ref 4.7–6.1)
SODIUM SERPL-SCNC: 124 MMOL/L (ref 136–145)
WBC # SPEC AUTO: 27.9 X10(3)/MCL (ref 4.5–11.5)

## 2023-09-07 PROCEDURE — C1729 CATH, DRAINAGE: HCPCS | Performed by: SURGERY

## 2023-09-07 PROCEDURE — 96372 THER/PROPH/DIAG INJ SC/IM: CPT

## 2023-09-07 PROCEDURE — 63600175 PHARM REV CODE 636 W HCPCS

## 2023-09-07 PROCEDURE — 85025 COMPLETE CBC W/AUTO DIFF WBC: CPT

## 2023-09-07 PROCEDURE — D9220A PRA ANESTHESIA: ICD-10-PCS | Mod: ,,, | Performed by: ANESTHESIOLOGY

## 2023-09-07 PROCEDURE — 80053 COMPREHEN METABOLIC PANEL: CPT

## 2023-09-07 PROCEDURE — 25000003 PHARM REV CODE 250: Performed by: SURGERY

## 2023-09-07 PROCEDURE — 37000009 HC ANESTHESIA EA ADD 15 MINS: Performed by: SURGERY

## 2023-09-07 PROCEDURE — 36000709 HC OR TIME LEV III EA ADD 15 MIN: Performed by: SURGERY

## 2023-09-07 PROCEDURE — G0378 HOSPITAL OBSERVATION PER HR: HCPCS

## 2023-09-07 PROCEDURE — 27000221 HC OXYGEN, UP TO 24 HOURS

## 2023-09-07 PROCEDURE — 25000003 PHARM REV CODE 250

## 2023-09-07 PROCEDURE — 63600175 PHARM REV CODE 636 W HCPCS: Performed by: ANESTHESIOLOGY

## 2023-09-07 PROCEDURE — 37000008 HC ANESTHESIA 1ST 15 MINUTES: Performed by: SURGERY

## 2023-09-07 PROCEDURE — 47562 LAPAROSCOPIC CHOLECYSTECTOMY: CPT | Mod: ,,, | Performed by: SURGERY

## 2023-09-07 PROCEDURE — 27201423 OPTIME MED/SURG SUP & DEVICES STERILE SUPPLY: Performed by: SURGERY

## 2023-09-07 PROCEDURE — 83735 ASSAY OF MAGNESIUM: CPT

## 2023-09-07 PROCEDURE — D9220A PRA ANESTHESIA: Mod: ,,, | Performed by: ANESTHESIOLOGY

## 2023-09-07 PROCEDURE — 47562 PR LAP,CHOLECYSTECTOMY: ICD-10-PCS | Mod: ,,, | Performed by: SURGERY

## 2023-09-07 PROCEDURE — 84100 ASSAY OF PHOSPHORUS: CPT

## 2023-09-07 PROCEDURE — 36000708 HC OR TIME LEV III 1ST 15 MIN: Performed by: SURGERY

## 2023-09-07 PROCEDURE — 71000033 HC RECOVERY, INTIAL HOUR: Performed by: SURGERY

## 2023-09-07 RX ORDER — ACETAMINOPHEN 10 MG/ML
INJECTION, SOLUTION INTRAVENOUS
Status: DISCONTINUED | OUTPATIENT
Start: 2023-09-07 | End: 2023-09-07

## 2023-09-07 RX ORDER — PROCHLORPERAZINE EDISYLATE 5 MG/ML
5 INJECTION INTRAMUSCULAR; INTRAVENOUS EVERY 30 MIN PRN
Status: DISCONTINUED | OUTPATIENT
Start: 2023-09-07 | End: 2023-09-07

## 2023-09-07 RX ORDER — SODIUM CHLORIDE 9 MG/ML
INJECTION, SOLUTION INTRAVENOUS CONTINUOUS
Status: DISCONTINUED | OUTPATIENT
Start: 2023-09-07 | End: 2023-09-09 | Stop reason: HOSPADM

## 2023-09-07 RX ORDER — PHENYLEPHRINE HCL IN 0.9% NACL 1 MG/10 ML
SYRINGE (ML) INTRAVENOUS
Status: DISCONTINUED | OUTPATIENT
Start: 2023-09-07 | End: 2023-09-07

## 2023-09-07 RX ORDER — HYDROMORPHONE HYDROCHLORIDE 2 MG/ML
0.4 INJECTION, SOLUTION INTRAMUSCULAR; INTRAVENOUS; SUBCUTANEOUS EVERY 5 MIN PRN
Status: DISCONTINUED | OUTPATIENT
Start: 2023-09-07 | End: 2023-09-07

## 2023-09-07 RX ORDER — MIDAZOLAM HYDROCHLORIDE 1 MG/ML
INJECTION INTRAMUSCULAR; INTRAVENOUS
Status: DISCONTINUED | OUTPATIENT
Start: 2023-09-07 | End: 2023-09-07

## 2023-09-07 RX ORDER — ONDANSETRON 2 MG/ML
4 INJECTION INTRAMUSCULAR; INTRAVENOUS DAILY PRN
Status: DISCONTINUED | OUTPATIENT
Start: 2023-09-07 | End: 2023-09-07

## 2023-09-07 RX ORDER — MEPERIDINE HYDROCHLORIDE 25 MG/ML
6.25 INJECTION INTRAMUSCULAR; INTRAVENOUS; SUBCUTANEOUS ONCE AS NEEDED
Status: DISCONTINUED | OUTPATIENT
Start: 2023-09-07 | End: 2023-09-07

## 2023-09-07 RX ORDER — PROPOFOL 10 MG/ML
VIAL (ML) INTRAVENOUS
Status: DISCONTINUED | OUTPATIENT
Start: 2023-09-07 | End: 2023-09-07

## 2023-09-07 RX ORDER — ACETAMINOPHEN 325 MG/1
650 TABLET ORAL EVERY 6 HOURS
Status: DISCONTINUED | OUTPATIENT
Start: 2023-09-07 | End: 2023-09-09 | Stop reason: HOSPADM

## 2023-09-07 RX ORDER — DEXTROSE MONOHYDRATE, SODIUM CHLORIDE, AND POTASSIUM CHLORIDE 50; 1.49; 4.5 G/1000ML; G/1000ML; G/1000ML
INJECTION, SOLUTION INTRAVENOUS CONTINUOUS
Status: CANCELLED | OUTPATIENT
Start: 2023-09-07

## 2023-09-07 RX ORDER — KETAMINE HYDROCHLORIDE 100 MG/ML
INJECTION, SOLUTION INTRAMUSCULAR; INTRAVENOUS
Status: DISCONTINUED | OUTPATIENT
Start: 2023-09-07 | End: 2023-09-07

## 2023-09-07 RX ORDER — LIDOCAINE HYDROCHLORIDE 20 MG/ML
INJECTION, SOLUTION EPIDURAL; INFILTRATION; INTRACAUDAL; PERINEURAL
Status: DISCONTINUED | OUTPATIENT
Start: 2023-09-07 | End: 2023-09-07

## 2023-09-07 RX ORDER — ROCURONIUM BROMIDE 10 MG/ML
INJECTION, SOLUTION INTRAVENOUS
Status: DISCONTINUED | OUTPATIENT
Start: 2023-09-07 | End: 2023-09-07

## 2023-09-07 RX ORDER — GLUCAGON 1 MG
1 KIT INJECTION
Status: DISCONTINUED | OUTPATIENT
Start: 2023-09-07 | End: 2023-09-09 | Stop reason: HOSPADM

## 2023-09-07 RX ORDER — BUPIVACAINE HYDROCHLORIDE AND EPINEPHRINE 2.5; 5 MG/ML; UG/ML
INJECTION, SOLUTION EPIDURAL; INFILTRATION; INTRACAUDAL; PERINEURAL
Status: DISCONTINUED | OUTPATIENT
Start: 2023-09-07 | End: 2023-09-07 | Stop reason: HOSPADM

## 2023-09-07 RX ORDER — INSULIN ASPART 100 [IU]/ML
0-10 INJECTION, SOLUTION INTRAVENOUS; SUBCUTANEOUS EVERY 6 HOURS PRN
Status: DISCONTINUED | OUTPATIENT
Start: 2023-09-07 | End: 2023-09-09 | Stop reason: HOSPADM

## 2023-09-07 RX ORDER — FENTANYL CITRATE 50 UG/ML
INJECTION, SOLUTION INTRAMUSCULAR; INTRAVENOUS
Status: DISCONTINUED | OUTPATIENT
Start: 2023-09-07 | End: 2023-09-07

## 2023-09-07 RX ORDER — SODIUM CHLORIDE 1 G/1
1000 TABLET ORAL 2 TIMES DAILY
Status: DISCONTINUED | OUTPATIENT
Start: 2023-09-07 | End: 2023-09-09 | Stop reason: HOSPADM

## 2023-09-07 RX ORDER — HEPARIN SODIUM 5000 [USP'U]/ML
INJECTION, SOLUTION INTRAVENOUS; SUBCUTANEOUS
Status: DISPENSED
Start: 2023-09-07 | End: 2023-09-07

## 2023-09-07 RX ADMIN — Medication 100 MCG: at 09:09

## 2023-09-07 RX ADMIN — SODIUM CHLORIDE, SODIUM GLUCONATE, SODIUM ACETATE, POTASSIUM CHLORIDE AND MAGNESIUM CHLORIDE: 526; 502; 368; 37; 30 INJECTION, SOLUTION INTRAVENOUS at 08:09

## 2023-09-07 RX ADMIN — FENTANYL CITRATE 50 MCG: 50 INJECTION, SOLUTION INTRAMUSCULAR; INTRAVENOUS at 09:09

## 2023-09-07 RX ADMIN — FENTANYL CITRATE 25 MCG: 50 INJECTION, SOLUTION INTRAMUSCULAR; INTRAVENOUS at 09:09

## 2023-09-07 RX ADMIN — SODIUM CHLORIDE 1000 MG: 1 TABLET ORAL at 09:09

## 2023-09-07 RX ADMIN — HYDROMORPHONE HYDROCHLORIDE 0.4 MG: 2 INJECTION, SOLUTION INTRAMUSCULAR; INTRAVENOUS; SUBCUTANEOUS at 11:09

## 2023-09-07 RX ADMIN — KETAMINE HYDROCHLORIDE 50 MG: 100 INJECTION, SOLUTION, CONCENTRATE INTRAMUSCULAR; INTRAVENOUS at 09:09

## 2023-09-07 RX ADMIN — ENOXAPARIN SODIUM 40 MG: 40 INJECTION SUBCUTANEOUS at 05:09

## 2023-09-07 RX ADMIN — LIDOCAINE HYDROCHLORIDE 80 MG: 20 INJECTION, SOLUTION EPIDURAL; INFILTRATION; INTRACAUDAL; PERINEURAL at 09:09

## 2023-09-07 RX ADMIN — PIPERACILLIN AND TAZOBACTAM 4.5 G: 4; .5 INJECTION, POWDER, LYOPHILIZED, FOR SOLUTION INTRAVENOUS; PARENTERAL at 01:09

## 2023-09-07 RX ADMIN — INSULIN ASPART 6 UNITS: 100 INJECTION, SOLUTION INTRAVENOUS; SUBCUTANEOUS at 04:09

## 2023-09-07 RX ADMIN — PIPERACILLIN AND TAZOBACTAM 4.5 G: 4; .5 INJECTION, POWDER, LYOPHILIZED, FOR SOLUTION INTRAVENOUS; PARENTERAL at 10:09

## 2023-09-07 RX ADMIN — ROCURONIUM BROMIDE 50 MG: 10 SOLUTION INTRAVENOUS at 09:09

## 2023-09-07 RX ADMIN — Medication 200 MCG: at 09:09

## 2023-09-07 RX ADMIN — ACETAMINOPHEN 650 MG: 325 TABLET, FILM COATED ORAL at 05:09

## 2023-09-07 RX ADMIN — Medication 100 MCG: at 10:09

## 2023-09-07 RX ADMIN — SUGAMMADEX 200 MG: 100 INJECTION, SOLUTION INTRAVENOUS at 10:09

## 2023-09-07 RX ADMIN — INSULIN ASPART 3 UNITS: 100 INJECTION, SOLUTION INTRAVENOUS; SUBCUTANEOUS at 06:09

## 2023-09-07 RX ADMIN — PIPERACILLIN AND TAZOBACTAM 4.5 G: 4; .5 INJECTION, POWDER, LYOPHILIZED, FOR SOLUTION INTRAVENOUS; PARENTERAL at 06:09

## 2023-09-07 RX ADMIN — ACETAMINOPHEN 1000 MG: 10 INJECTION, SOLUTION INTRAVENOUS at 09:09

## 2023-09-07 RX ADMIN — PROPOFOL 80 MG: 10 INJECTION, EMULSION INTRAVENOUS at 09:09

## 2023-09-07 RX ADMIN — SODIUM CHLORIDE: 9 INJECTION, SOLUTION INTRAVENOUS at 11:09

## 2023-09-07 RX ADMIN — INSULIN ASPART 2 UNITS: 100 INJECTION, SOLUTION INTRAVENOUS; SUBCUTANEOUS at 09:09

## 2023-09-07 RX ADMIN — ROCURONIUM BROMIDE 20 MG: 10 SOLUTION INTRAVENOUS at 09:09

## 2023-09-07 RX ADMIN — MIDAZOLAM HYDROCHLORIDE 1 MG: 1 INJECTION, SOLUTION INTRAMUSCULAR; INTRAVENOUS at 08:09

## 2023-09-07 RX ADMIN — HEPARIN SODIUM 5000 UNITS: 5000 INJECTION, SOLUTION INTRAVENOUS; SUBCUTANEOUS at 09:09

## 2023-09-07 RX ADMIN — ACETAMINOPHEN 650 MG: 325 TABLET, FILM COATED ORAL at 01:09

## 2023-09-07 RX ADMIN — HYDROMORPHONE HYDROCHLORIDE 0.2 MG: 2 INJECTION, SOLUTION INTRAMUSCULAR; INTRAVENOUS; SUBCUTANEOUS at 03:09

## 2023-09-07 NOTE — ANESTHESIA PROCEDURE NOTES
Intubation    Date/Time: 9/7/2023 9:05 AM    Performed by: Celina Mccabe  Authorized by: Mirella Washburn MD    Intubation:     Induction:  Intravenous    Intubated:  Postinduction    Mask Ventilation:  Easy mask    Attempts:  1    Attempted By:  Student    Method of Intubation:  Direct    Blade:  Carter 2    Laryngeal View Grade: Grade IIA - cords partially seen      Difficult Airway Encountered?: No      Complications:  None    Airway Device:  Oral endotracheal tube    Airway Device Size:  7.5    Style/Cuff Inflation:  Cuffed (inflated to minimal occlusive pressure)    Inflation Amount (mL):  6    Tube secured:  22    Secured at:  The teeth    Placement Verified By:  Capnometry    Complicating Factors:  None    Findings Post-Intubation:  BS equal bilateral and atraumatic/condition of teeth unchanged

## 2023-09-07 NOTE — PLAN OF CARE
Problem: Adult Inpatient Plan of Care  Goal: Plan of Care Review  9/7/2023 1335 by David Chong RN  Outcome: Ongoing, Progressing  9/7/2023 1335 by David Chong RN  Outcome: Ongoing, Progressing  Goal: Patient-Specific Goal (Individualized)  9/7/2023 1335 by David Chong RN  Outcome: Ongoing, Progressing  9/7/2023 1335 by David Chong RN  Outcome: Ongoing, Progressing  Goal: Absence of Hospital-Acquired Illness or Injury  9/7/2023 1335 by David Chong RN  Outcome: Ongoing, Progressing  9/7/2023 1335 by David Chong RN  Outcome: Ongoing, Progressing  Goal: Optimal Comfort and Wellbeing  9/7/2023 1335 by David Chong RN  Outcome: Ongoing, Progressing  9/7/2023 1335 by David Chong RN  Outcome: Ongoing, Progressing  Goal: Readiness for Transition of Care  9/7/2023 1335 by David Chong RN  Outcome: Ongoing, Progressing  9/7/2023 1335 by David Chong RN  Outcome: Ongoing, Progressing     Problem: Fall Injury Risk  Goal: Absence of Fall and Fall-Related Injury  9/7/2023 1335 by David Chong RN  Outcome: Ongoing, Progressing  9/7/2023 1335 by David Chong RN  Outcome: Ongoing, Progressing

## 2023-09-07 NOTE — BRIEF OP NOTE
Brief Operative Note  Kishore Anderson  MRN:  [id number]  : 2023   [facility] OR  Surgeon(s):  Evan Santana MD   [facility]        Procedure: Procedures:    * CHOLECYSTECTOMY, LAPAROSCOPIC  Partial cholecystectomy      Pre-op Dx: acute cholecystitis    Post-op Dx: gangrenous cholecystitis     Staff: Evan Santana MD    Resident Surgeon: Celena Salgado MD    Anesthesia: general      Indication for Procedure:   acute cholecystitis         Procedure Details   See op note    Findings:gangrenous gallbladder    Estimated Blood Loss:  50mL         Drains: (14) Louie-Oliveros drain(s) with closed bulb suction in the RUQ           Specimens:   Specimens (From admission, onward)       Start     Ordered    23  Specimen to Pathology  RELEASE UPON ORDERING        References:    Click here for ordering Quick Tip   Question:  Release to patient  Answer:  Immediate    23                           Implants: * No implants in log *     Complications:  none           Disposition: PACU - hemodynamically stable.           Condition: stable    Evan Santana MD  Phone Number: [phone]      Celena Salgado MD 2023 10:40 AM

## 2023-09-07 NOTE — OP NOTE
Operation:  Laparoscopic cholecystectomy    Date of operation:  September 7, 2023     Surgeon:Evan Santana MD    Co-surgeon: Celena Salgado MD    Preop diagnosis:  Acute on chronic cholecystitis    Postop diagnosis: Same     Indications:  73-year-old male with acute on chronic cholecystitis, right upper quadrant pain    Anesthesia: General endotracheal     Complications:  None     Blood loss:  30 cc    Specimens:  Gallbladder    Disposition:  Stable satisfactory     Details of operation:  Patient was brought to the operating room laid supine on operating table, general anesthesia was administered he was intubated     A periumbilical 11 mm Optiview trocar was placed pneumoperitoneum was achieved     There was a lot of inflammation covering the gallbladder, omentum was removed from the gallbladder, the gallbladder was necrotic appearing with full-thickness gangrene     The gallbladder was elevated cephalad direction in the we attempted to skeletonize the cystic duct and cystic artery however the area was so much inflammation that doing so I believed would have expose the patient to greater risk of common bile duct injury are significant bleeding     I then employ a top-down approach using the electrocautery to remove the gallbladder from the gallbladder fossa working my cell all the way down to the hilum     Once the infundibulum of the gallbladder was reached, 2 PDS Endoloops were placed across the infundibulum of the gallbladder, and the gallbladder was divided distal to that, the gallbladder was passed off to pathology as specimen     I will bleeding was assured with the electrocautery    Fifteen Citizen of [place] Rafael drain was placed in the in the gallbladder fossa     Trans abdominis preperitoneal block was performed with 10 cc of bupivacaine for postoperative analgesia     All ports were removed under direct laparoscopic vision and skin incisions were closed with 4-0 subcuticular Vicryl sutures and sterile dressings

## 2023-09-07 NOTE — PROGRESS NOTES
"Inpatient Nutrition Assessment    Admit Date: 9/6/2023   Total duration of encounter: 1 day     Nutrition Recommendation/Prescription     -Advance diet as tolerated per MD. Goal Diet: Diabetic Diet.   -Monitor wt, labs, and intake.     Communication of Recommendations:  EMR    Nutrition Assessment     Malnutrition Assessment/Nutrition-Focused Physical Exam       Malnutrition Level:  (unable to evaluate)                                                        A minimum of two characteristics is recommended for diagnosis of either severe or non-severe malnutrition.    Chart Review    Reason Seen: malnutrition screening tool (MST)    Malnutrition Screening Tool Results   Have you recently lost weight without trying?: Yes: 2-13 lbs  Have you been eating poorly because of a decreased appetite?: Yes   MST Score: 2     Diagnosis:  acute cholecystitis s/p lap [place]inton 9/7    Relevant Medical History:  HTN, DM     Nutrition-Related Medications: reviewed  Calorie Containing IV Medications: no significant kcals from medications at this time    Nutrition-Related Labs:  9/7/23: Na 124, Glu 290, GFR>60    Diet/PN Order: Diet clear liquid  Oral Supplement Order: none  Tube Feeding Order: none  Appetite/Oral Intake: not applicable/not applicable  Factors Affecting Nutritional Intake: clear liquid diet  Food/Confucianist/Cultural Preferences: unable to obtain  Food Allergies: no known food allergies    Skin Integrity: drain/device(s), incision  Wound(s):   surgical incision     Comments    9/7/23: Pt in OR during time of rounds; noted diet just advanced to clears- tolerance pending. Noted per MD noted, pt with n/v for 5 days prior to admit; will conduct NFPE on f/u.     Anthropometrics    Height: 5' 6" (167.6 cm) Height Method: Stated  Last Weight: 71.7 kg (158 lb) (09/07/23 0036) Weight Method: Standard Scale  BMI (Calculated): 25.5  BMI Classification: overweight (BMI 25-29.9)        Ideal Body Weight (IBW), Male: 142 lb     % Ideal Body " Weight, Male (lb): 111.27 %                          Usual Weight Provided By: unable to obtain usual weight    Wt Readings from Last 5 Encounters:   23 71.7 kg (158 lb)     Weight Change(s) Since Admission:  Admit Weight: 71.7 kg (158 lb) (23 0223)      Estimated Needs    Weight Used For Calorie Calculations: 71.7 kg (158 lb 1.1 oz)  Energy Calorie Requirements (kcal): 5556-8205 kcal (25-30 kcal/kg)  Energy Need Method: Kcal/kg  Weight Used For Protein Calculations: 71.7 kg (158 lb 1.1 oz)  Protein Requirements: 108 gm (1.5g/kg)  Fluid Requirements (mL): 2151 mL  Temp (24hrs), Av.1 °F (37.3 °C), Min:97.9 °F (36.6 °C), Max:99.8 °F (37.7 °C)       Enteral Nutrition    Patient not receiving enteral nutrition at this time.    Parenteral Nutrition    Patient not receiving parenteral nutrition support at this time.    Evaluation of Received Nutrient Intake    Calories: not meeting estimated needs  Protein: not meeting estimated needs    Patient Education    Not applicable.    Nutrition Diagnosis     PES: Inadequate energy intake related to acute illness as evidenced by NPO /clear liquid since admit. (new)    Interventions/Goals     Intervention(s): general/healthful diet and collaboration with other providers  Goal: Meet greater than 75% of nutritional needs by follow-up. (new)    Monitoring & Evaluation     Dietitian will monitor energy intake and weight change.  Nutrition Risk/Follow-Up: moderate (follow-up in 3-5 days)   Please consult if re-assessment needed sooner.

## 2023-09-07 NOTE — PROGRESS NOTES
"   Acute Care Surgery   Progress Note  Admit Date: 9/6/2023  HD#1  POD#Day of Surgery    Subjective:   Interval history:  NAEON, AF, VSS  Abdominal pain improved with PRN pain medications  Denies nausea or vomiting  Tolerated CLD  NPO for OR today    Home Meds:No current outpatient medications   Scheduled Meds:   enoxparin  40 mg Subcutaneous Daily    heparin (porcine)  5,000 Units Subcutaneous Once    piperacillin-tazobactam (Zosyn) IV (PEDS and ADULTS) (extended infusion is not appropriate)  4.5 g Intravenous Q8H    sodium chloride  1,000 mg Oral BID     Continuous Infusions:   lactated ringers 75 mL/hr at 09/06/23 1245     PRN Meds:acetaminophen, acetaminophen, dextrose 10%, dextrose 10%, glucagon (human recombinant), glucose, glucose, HYDROmorphone, insulin aspart U-100, LIDOcaine (PF) 10 mg/ml (1%), melatonin, ondansetron, oxyCODONE, oxyCODONE, sodium chloride 0.9%     Objective:     VITAL SIGNS: 24 HR MIN & MAX LAST   Temp  Min: 98.1 °F (36.7 °C)  Max: 99.7 °F (37.6 °C)  99 °F (37.2 °C)   BP  Min: 128/65  Max: 148/76  129/72    Pulse  Min: 93  Max: 105  105    Resp  Min: 14  Max: 25  19    SpO2  Min: 89 %  Max: 98 %  (!) 90 %      HT: 5' 6" (167.6 cm)  WT: 71.7 kg (158 lb)  BMI: 25.5     Intake/output:  Intake/Output - Last 3 Shifts         09/05 0700 09/06 0659 09/06 0700  09/07 0659    Urine (mL/kg/hr)  200 (0.1)    Total Output  200    Net  -200                  Intake/Output Summary (Last 24 hours) at 9/7/2023 0646  Last data filed at 9/7/2023 0322  Gross per 24 hour   Intake --   Output 200 ml   Net -200 ml           Lines/drains/airway:       Peripheral IV - Single Lumen 09/06/23 0242 20 G Anterior;Distal;Left Upper Arm (Active)   Number of days: 1            Peripheral IV - Single Lumen 09/06/23 1259 20 G Anterior;Right Forearm (Active)   Number of days: 0       Physical examination:  Gen: NAD, AAOx3, answering questions appropriately  HEENT: EOMI, MMM  CV: RR  Resp: NWOB  Abd: Soft, nondistended, " "moderate RUQ TTP, + Finch's sign  Ext: moving all extremities spontaneously and purposefully  Neuro: CN II-XII grossly intact  Skin/wounds: [facility]    Labs:  Renal:  Recent Labs     09/06/23 0249 09/07/23 0358   BUN 13.5 19.5   CREATININE 0.97 1.09     Recent Labs     09/06/23 0249   LACTIC 1.3     FENGI:  Recent Labs     09/06/23 0249 09/07/23 0358   * 124*   K 4.1 4.6   CO2 28 24   CALCIUM 9.1 8.3*   MG  --  1.80   PHOS  --  2.8   ALBUMIN 3.3* 2.5*   BILITOT 2.1* 3.2*   AST 23 30   ALKPHOS 134 185*   ALT 22 24     Heme:  Recent Labs     09/06/23 0249 09/07/23 0358   HGB 15.4 13.7*   HCT 42.9 38.6*    278   PTT 33.8  --    INR 1.2*  --      ID:  Recent Labs     09/06/23 0249 09/07/23 0358   WBC 22.80* 27.90*     CBG:  Recent Labs     09/06/23 0249 09/07/23 0358   GLUCOSE 223* 290*      Cardiovascular:  Recent Labs   Lab 09/06/23 0249   TROPONINI <0.010     ABG:  No results for input(s): "PH", "PO2", "PCO2", "HCO3", "BE" in the last 168 hours.   I have reviewed all pertinent lab results within the past 24 hours.    Imaging:  US Abdomen Limited_Gallbladder   Final Result      As above.  Findings concerning for acute cholecystitis.         Electronically signed by: Luis Fernando Hoyt   Date:    09/06/2023   Time:    07:04      CT Abdomen Pelvis With Contrast   Final Result      1. Gallbladder distension with pericholecystic inflammatory stranding suspicious for acute cholecystitis.   2. Trace ascites.   3. Trace right pleural effusion and bibasilar atelectasis.   [employer] concordance         Electronically signed by: Dinesh Ladd MD   Date:    09/06/2023   Time:    07:30         I have reviewed all pertinent imaging results/findings within the past 24 hours.    Micro/Path/Other:  Microbiology Results (last 7 days)       Procedure Component Value Units Date/Time    Blood Culture #2 **CANNOT BE ORDERED STAT** [[id number]] Collected: 09/06/23 0345    Order Status: Resulted Specimen: Blood Updated: " 09/06/23 0345    Blood Culture #1 **CANNOT BE ORDERED STAT** [[id number]] Collected: 09/06/23 0345    Order Status: Resulted Specimen: Blood Updated: 09/06/23 0345           Specimen (168h ago, onward)      None           Assessment & Plan:   Patient is a 73 year old male with history HTN, DM who presented 9/6 with acute onset RUQ abdominal pain found to have acute cholecystitis on RUQ US.     -NPO  -To OR for laparoscopic cholecystectomy  -Continue IV antibiotics  -[facility]  -mI    Dmitriy Lewis MD  [employer] General Surgery PGY-1

## 2023-09-07 NOTE — ANESTHESIA POSTPROCEDURE EVALUATION
Anesthesia Post Evaluation    Patient: Kishore Anderson    Procedure(s) Performed: Procedure(s) (LRB):  CHOLECYSTECTOMY, LAPAROSCOPIC (N/A)    Final Anesthesia Type: general      Patient location during evaluation: PACU  Patient participation: Yes- Able to Participate  Level of consciousness: awake and alert  Post-procedure vital signs: reviewed and stable  Pain management: adequate  Airway patency: patent    PONV status at discharge: No PONV  Anesthetic complications: no      Cardiovascular status: hemodynamically stable  Respiratory status: unassisted  Hydration status: euvolemic  Follow-up not needed.          Vitals Value Taken Time   /73 09/07/23 1245   Temp 36.6 °C (97.9 °F) 09/07/23 1053   Pulse 100 09/07/23 1251   Resp 25 09/07/23 1138   SpO2 94 % 09/07/23 1251   Vitals shown include unvalidated device data.      Event Time   Out of Recovery 09/07/2023 11:40:00         Pain/Joseph Score: Pain Rating Prior to Med Admin: 6 (9/7/2023 11:20 AM)  Pain Rating Post Med Admin: 0 (9/7/2023  3:45 AM)  Joseph Score: 8 (9/7/2023 11:30 AM)

## 2023-09-07 NOTE — TRANSFER OF CARE
"Anesthesia Transfer of Care Note    Patient: Kishore Anderson    Procedure(s) Performed: Procedure(s) (LRB):  CHOLECYSTECTOMY, LAPAROSCOPIC (N/A)    Patient location: PACU    Anesthesia Type: general    Transport from OR: Transported from OR on 2-3 L/min O2 by NC with adequate spontaneous ventilation    Post pain: adequate analgesia    Post assessment: no apparent anesthetic complications    Post vital signs: stable    Level of consciousness: sedated    Nausea/Vomiting: no nausea/vomiting    Complications: none    Transfer of care protocol was followed      Last vitals:   Visit Vitals  /70 (BP Location: Right arm)   Pulse 98   Temp 36.6 °C (97.9 °F) (Skin)   Resp (!) 22   Ht 5' 6" (1.676 m)   Wt 71.7 kg (158 lb)   SpO2 96%   BMI 25.50 kg/m²     "

## 2023-09-08 LAB
ALBUMIN SERPL-MCNC: 2.1 G/DL (ref 3.4–4.8)
ALBUMIN/GLOB SERPL: 0.7 RATIO (ref 1.1–2)
ALP SERPL-CCNC: 180 UNIT/L (ref 40–150)
ALT SERPL-CCNC: 33 UNIT/L (ref 0–55)
AST SERPL-CCNC: 40 UNIT/L (ref 5–34)
BASOPHILS # BLD AUTO: 0.04 X10(3)/MCL
BASOPHILS NFR BLD AUTO: 0.3 %
BILIRUB SERPL-MCNC: 1.7 MG/DL
BUN SERPL-MCNC: 17.7 MG/DL (ref 8.4–25.7)
CALCIUM SERPL-MCNC: 7.5 MG/DL (ref 8.8–10)
CHLORIDE SERPL-SCNC: 100 MMOL/L (ref 98–107)
CO2 SERPL-SCNC: 24 MMOL/L (ref 23–31)
CREAT SERPL-MCNC: 0.86 MG/DL (ref 0.73–1.18)
EOSINOPHIL # BLD AUTO: 0.05 X10(3)/MCL (ref 0–0.9)
EOSINOPHIL NFR BLD AUTO: 0.3 %
ERYTHROCYTE [DISTWIDTH] IN BLOOD BY AUTOMATED COUNT: 11.8 % (ref 11.5–17)
GFR SERPLBLD CREATININE-BSD FMLA CKD-EPI: >60 MLS/MIN/1.73/M2
GLOBULIN SER-MCNC: 2.9 GM/DL (ref 2.4–3.5)
GLUCOSE SERPL-MCNC: 222 MG/DL (ref 82–115)
HCT VFR BLD AUTO: 36.3 % (ref 42–52)
HGB BLD-MCNC: 12.8 G/DL (ref 14–18)
IMM GRANULOCYTES # BLD AUTO: 0.1 X10(3)/MCL (ref 0–0.04)
IMM GRANULOCYTES NFR BLD AUTO: 0.6 %
LYMPHOCYTES # BLD AUTO: 0.99 X10(3)/MCL (ref 0.6–4.6)
LYMPHOCYTES NFR BLD AUTO: 6.4 %
MAGNESIUM SERPL-MCNC: 2.2 MG/DL (ref 1.6–2.6)
MCH RBC QN AUTO: 30.5 PG (ref 27–31)
MCHC RBC AUTO-ENTMCNC: 35.3 G/DL (ref 33–36)
MCV RBC AUTO: 86.6 FL (ref 80–94)
MONOCYTES # BLD AUTO: 1.07 X10(3)/MCL (ref 0.1–1.3)
MONOCYTES NFR BLD AUTO: 6.9 %
NEUTROPHILS # BLD AUTO: 13.21 X10(3)/MCL (ref 2.1–9.2)
NEUTROPHILS NFR BLD AUTO: 85.5 %
NRBC BLD AUTO-RTO: 0 %
PHOSPHATE SERPL-MCNC: 2.1 MG/DL (ref 2.3–4.7)
PLATELET # BLD AUTO: 290 X10(3)/MCL (ref 130–400)
PMV BLD AUTO: 10.1 FL (ref 7.4–10.4)
POCT GLUCOSE: 153 MG/DL (ref 70–110)
POCT GLUCOSE: 197 MG/DL (ref 70–110)
POCT GLUCOSE: 272 MG/DL (ref 70–110)
POCT GLUCOSE: 356 MG/DL (ref 70–110)
POTASSIUM SERPL-SCNC: 4.1 MMOL/L (ref 3.5–5.1)
PROT SERPL-MCNC: 5 GM/DL (ref 5.8–7.6)
PSYCHE PATHOLOGY RESULT: NORMAL
RBC # BLD AUTO: 4.19 X10(6)/MCL (ref 4.7–6.1)
SODIUM SERPL-SCNC: 133 MMOL/L (ref 136–145)
WBC # SPEC AUTO: 15.46 X10(3)/MCL (ref 4.5–11.5)

## 2023-09-08 PROCEDURE — 25000003 PHARM REV CODE 250

## 2023-09-08 PROCEDURE — 83735 ASSAY OF MAGNESIUM: CPT

## 2023-09-08 PROCEDURE — 85025 COMPLETE CBC W/AUTO DIFF WBC: CPT

## 2023-09-08 PROCEDURE — 84100 ASSAY OF PHOSPHORUS: CPT

## 2023-09-08 PROCEDURE — 96367 TX/PROPH/DG ADDL SEQ IV INF: CPT

## 2023-09-08 PROCEDURE — 80053 COMPREHEN METABOLIC PANEL: CPT

## 2023-09-08 PROCEDURE — G0378 HOSPITAL OBSERVATION PER HR: HCPCS

## 2023-09-08 PROCEDURE — 63600175 PHARM REV CODE 636 W HCPCS

## 2023-09-08 PROCEDURE — 96366 THER/PROPH/DIAG IV INF ADDON: CPT

## 2023-09-08 PROCEDURE — 96372 THER/PROPH/DIAG INJ SC/IM: CPT

## 2023-09-08 RX ADMIN — ACETAMINOPHEN 650 MG: 325 TABLET, FILM COATED ORAL at 02:09

## 2023-09-08 RX ADMIN — PIPERACILLIN AND TAZOBACTAM 4.5 G: 4; .5 INJECTION, POWDER, LYOPHILIZED, FOR SOLUTION INTRAVENOUS; PARENTERAL at 06:09

## 2023-09-08 RX ADMIN — ACETAMINOPHEN 650 MG: 325 TABLET, FILM COATED ORAL at 12:09

## 2023-09-08 RX ADMIN — PIPERACILLIN AND TAZOBACTAM 4.5 G: 4; .5 INJECTION, POWDER, LYOPHILIZED, FOR SOLUTION INTRAVENOUS; PARENTERAL at 09:09

## 2023-09-08 RX ADMIN — PIPERACILLIN AND TAZOBACTAM 4.5 G: 4; .5 INJECTION, POWDER, LYOPHILIZED, FOR SOLUTION INTRAVENOUS; PARENTERAL at 02:09

## 2023-09-08 RX ADMIN — ACETAMINOPHEN 650 MG: 325 TABLET, FILM COATED ORAL at 11:09

## 2023-09-08 RX ADMIN — SODIUM PHOSPHATE, MONOBASIC, MONOHYDRATE AND SODIUM PHOSPHATE, DIBASIC, ANHYDROUS 20.01 MMOL: 142; 276 INJECTION, SOLUTION INTRAVENOUS at 06:09

## 2023-09-08 RX ADMIN — OXYCODONE HYDROCHLORIDE 5 MG: 5 TABLET ORAL at 12:09

## 2023-09-08 RX ADMIN — ENOXAPARIN SODIUM 40 MG: 40 INJECTION SUBCUTANEOUS at 05:09

## 2023-09-08 RX ADMIN — INSULIN ASPART 10 UNITS: 100 INJECTION, SOLUTION INTRAVENOUS; SUBCUTANEOUS at 12:09

## 2023-09-08 RX ADMIN — OXYCODONE HYDROCHLORIDE 5 MG: 5 TABLET ORAL at 07:09

## 2023-09-08 RX ADMIN — INSULIN ASPART 6 UNITS: 100 INJECTION, SOLUTION INTRAVENOUS; SUBCUTANEOUS at 06:09

## 2023-09-08 RX ADMIN — ACETAMINOPHEN 650 MG: 325 TABLET, FILM COATED ORAL at 05:09

## 2023-09-08 RX ADMIN — SODIUM CHLORIDE 1000 MG: 1 TABLET ORAL at 08:09

## 2023-09-08 RX ADMIN — INSULIN ASPART 1 UNITS: 100 INJECTION, SOLUTION INTRAVENOUS; SUBCUTANEOUS at 09:09

## 2023-09-08 NOTE — PROGRESS NOTES
"   Acute Care Surgery   Progress Note  Admit Date: 9/6/2023  HD#1  POD#1 Day Post-Op    Subjective:   Interval history:  POD#1 from laparoscopic cholecystectomy for gangrenous cholecystitis  KARSONEON, AF, VSS  Pain much better since surgery  VANESSA with 285cc bilious/sanguinous output since OR    Home Meds:No current outpatient medications   Scheduled Meds:   acetaminophen  650 mg Oral Q6H    enoxparin  40 mg Subcutaneous Daily    piperacillin-tazobactam (Zosyn) IV (PEDS and ADULTS) (extended infusion is not appropriate)  4.5 g Intravenous Q8H    sodium chloride  1,000 mg Oral BID     Continuous Infusions:   sodium chloride 0.9% 100 mL/hr at 09/07/23 1157     PRN Meds:dextrose 10%, dextrose 10%, dextrose 10%, dextrose 10%, glucagon (human recombinant), glucagon (human recombinant), glucose, glucose, HYDROmorphone, insulin aspart U-100, LIDOcaine (PF) 10 mg/ml (1%), melatonin, ondansetron, oxyCODONE, oxyCODONE, sodium chloride 0.9%     Objective:     VITAL SIGNS: 24 HR MIN & MAX LAST   Temp  Min: 97.6 °F (36.4 °C)  Max: 98.9 °F (37.2 °C)  98.9 °F (37.2 °C)   BP  Min: 107/58  Max: 141/74  116/67    Pulse  Min: 80  Max: 93  84    Resp  Min: 18  Max: 18  18    SpO2  Min: 89 %  Max: 94 %  (!) 89 %      HT: 5' 6" (167.6 cm)  WT: 71.7 kg (158 lb)  BMI: 25.5     Intake/output:  Intake/Output - Last 3 Shifts         09/06 0700  09/07 0659 09/07 0700 09/08 0659 09/08 0700  09/09 0659    P.O.  1240     IV Piggyback  700     Total Intake(mL/kg)  1940 (27.1)     Urine (mL/kg/hr) 200 (0.1) 1100 (0.6)     Drains  285     Stool  0     Total Output 200 1385     Net -200 +555            Stool Occurrence  0 x             Intake/Output Summary (Last 24 hours) at 9/8/2023 1555  Last data filed at 9/7/2023 2300  Gross per 24 hour   Intake 1240 ml   Output 1075 ml   Net 165 ml           Lines/drains/airway:       Peripheral IV - Single Lumen 09/06/23 0242 20 G Anterior;Distal;Left Upper Arm (Active)   Site Assessment Clean;Dry;Intact;No " redness;No swelling 09/08/23 0800   Extremity Assessment Distal to IV No abnormal discoloration 09/08/23 0800   Line Status Infusing 09/08/23 0800   Dressing Status Clean;Dry;Intact 09/08/23 0800   Dressing Intervention Integrity maintained 09/08/23 0800   Number of days: 2            Peripheral IV - Single Lumen 09/06/23 1259 20 G Anterior;Right Forearm (Active)   Site Assessment Clean;Dry;Intact;No redness;No swelling 09/08/23 0800   Extremity Assessment Distal to IV No abnormal discoloration 09/08/23 0800   Line Status Saline locked 09/08/23 0800   Dressing Status Dry;Clean;Intact 09/08/23 0800   Dressing Intervention Integrity maintained 09/08/23 0800   Number of days: 2            Closed/Suction Drain 09/07/23 1032 Lateral RUQ 14 Fr. (Active)   Site Description Leaking at site 09/08/23 0800   Dressing Type Transparent (Tegaderm) 09/08/23 0800   Dressing Status New drainage 09/08/23 0800   Dressing Intervention Sterile dressing change 09/08/23 0800   Drainage Serosanguineous 09/08/23 0800   Status To bulb suction 09/08/23 0800   Output (mL) 25 mL 09/07/23 2300   Number of days: 1       Physical examination:  Gen: NAD, AAOx3, answering questions appropriately  HEENT: EOMI, MMM  CV: RR  Resp: NWOB  Abd: Soft, nondistended, moderate TTP; laparoscopic incision sites c/d/I, VANESSA with bilio-sanguinous output  Ext: moving all extremities spontaneously and purposefully  Neuro: CN II-XII grossly intact    Labs:  Renal:  Recent Labs     09/06/23 0249 09/07/23 0358 09/08/23 0355   BUN 13.5 19.5 17.7   CREATININE 0.97 1.09 0.86     Recent Labs     09/06/23  0249   LACTIC 1.3     FENGI:  Recent Labs     09/06/23  0249 09/07/23 0358 09/08/23 0355   * 124* 133*   K 4.1 4.6 4.1   CO2 28 24 24   CALCIUM 9.1 8.3* 7.5*   MG  --  1.80 2.20   PHOS  --  2.8 2.1*   ALBUMIN 3.3* 2.5* 2.1*   BILITOT 2.1* 3.2* 1.7*   AST 23 30 40*   ALKPHOS 134 185* 180*   ALT 22 24 33     Heme:  Recent Labs     09/06/23  0249 09/07/23  0358  "09/08/23 0355   HGB 15.4 13.7* 12.8*   HCT 42.9 38.6* 36.3*    278 290   PTT 33.8  --   --    INR 1.2*  --   --      ID:  Recent Labs     09/06/23 0249 09/07/23 0358 09/08/23 0355   WBC 22.80* 27.90* 15.46*     CBG:  Recent Labs     09/06/23 0249 09/07/23 0358 09/08/23 0355   GLUCOSE 223* 290* 222*      Cardiovascular:  Recent Labs   Lab 09/06/23 0249   TROPONINI <0.010     ABG:  No results for input(s): "PH", "PO2", "PCO2", "HCO3", "BE" in the last 168 hours.   I have reviewed all pertinent lab results within the past 24 hours.    Imaging:  US Abdomen Limited_Gallbladder   Final Result      As above.  Findings concerning for acute cholecystitis.         Electronically signed by: Luis Fernando Hoyt   Date:    09/06/2023   Time:    07:04      CT Abdomen Pelvis With Contrast   Final Result      1. Gallbladder distension with pericholecystic inflammatory stranding suspicious for acute cholecystitis.   2. Trace ascites.   3. Trace right pleural effusion and bibasilar atelectasis.   [employer] concordance         Electronically signed by: Dinesh Ladd MD   Date:    09/06/2023   Time:    07:30         I have reviewed all pertinent imaging results/findings within the past 24 hours.    Micro/Path/Other:  Microbiology Results (last 7 days)       Procedure Component Value Units Date/Time    Blood Culture #1 **CANNOT BE ORDERED STAT** [[id number]]  (Normal) Collected: 09/06/23 0345    Order Status: Completed Specimen: Blood Updated: 09/08/23 0900     CULTURE, BLOOD ([facility]) No Growth At 48 Hours    Blood Culture #2 **CANNOT BE ORDERED STAT** [[id number]]  (Normal) Collected: 09/06/23 0345    Order Status: Completed Specimen: Blood Updated: 09/08/23 0900     CULTURE, BLOOD ([facility]) No Growth At 48 Hours           Specimen (168h ago, onward)       Start     Ordered    09/07/23 0944  Specimen to Pathology  RELEASE UPON ORDERING        References:    Click here for ordering Quick Tip   Question:  Release to patient  Answer:  " Immediate    09/07/23 0944                   Assessment & Plan:   Patient is a 73 year old male with history HTN, DM who presented 9/6 with acute onset RUQ abdominal pain found to have acute cholecystitis on RUQ US sow s/p laparoscopic cholecystectomy 9/7.    -Advance diet  -Continue zosyn  -[facility]  -Lovenox for DVT ppx    Dmitriy Lewis MD  [employer] General Surgery PGY-1

## 2023-09-09 VITALS
DIASTOLIC BLOOD PRESSURE: 74 MMHG | RESPIRATION RATE: 20 BRPM | BODY MASS INDEX: 25.39 KG/M2 | SYSTOLIC BLOOD PRESSURE: 168 MMHG | HEIGHT: 66 IN | WEIGHT: 158 LBS | TEMPERATURE: 98 F | OXYGEN SATURATION: 92 % | HEART RATE: 94 BPM

## 2023-09-09 LAB
ALBUMIN SERPL-MCNC: 2 G/DL (ref 3.4–4.8)
ALBUMIN/GLOB SERPL: 0.6 RATIO (ref 1.1–2)
ALP SERPL-CCNC: 229 UNIT/L (ref 40–150)
ALT SERPL-CCNC: 29 UNIT/L (ref 0–55)
AST SERPL-CCNC: 34 UNIT/L (ref 5–34)
BASOPHILS # BLD AUTO: 0.04 X10(3)/MCL
BASOPHILS NFR BLD AUTO: 0.4 %
BILIRUB SERPL-MCNC: 1.3 MG/DL
BUN SERPL-MCNC: 11.5 MG/DL (ref 8.4–25.7)
CALCIUM SERPL-MCNC: 7.6 MG/DL (ref 8.8–10)
CHLORIDE SERPL-SCNC: 103 MMOL/L (ref 98–107)
CO2 SERPL-SCNC: 22 MMOL/L (ref 23–31)
CREAT SERPL-MCNC: 0.76 MG/DL (ref 0.73–1.18)
EOSINOPHIL # BLD AUTO: 0.16 X10(3)/MCL (ref 0–0.9)
EOSINOPHIL NFR BLD AUTO: 1.5 %
ERYTHROCYTE [DISTWIDTH] IN BLOOD BY AUTOMATED COUNT: 11.9 % (ref 11.5–17)
GFR SERPLBLD CREATININE-BSD FMLA CKD-EPI: >60 MLS/MIN/1.73/M2
GLOBULIN SER-MCNC: 3.2 GM/DL (ref 2.4–3.5)
GLUCOSE SERPL-MCNC: 145 MG/DL (ref 82–115)
HCT VFR BLD AUTO: 39.6 % (ref 42–52)
HGB BLD-MCNC: 13.8 G/DL (ref 14–18)
IMM GRANULOCYTES # BLD AUTO: 0.07 X10(3)/MCL (ref 0–0.04)
IMM GRANULOCYTES NFR BLD AUTO: 0.7 %
LYMPHOCYTES # BLD AUTO: 1.53 X10(3)/MCL (ref 0.6–4.6)
LYMPHOCYTES NFR BLD AUTO: 14.3 %
MAGNESIUM SERPL-MCNC: 2 MG/DL (ref 1.6–2.6)
MCH RBC QN AUTO: 30.6 PG (ref 27–31)
MCHC RBC AUTO-ENTMCNC: 34.8 G/DL (ref 33–36)
MCV RBC AUTO: 87.8 FL (ref 80–94)
MONOCYTES # BLD AUTO: 0.97 X10(3)/MCL (ref 0.1–1.3)
MONOCYTES NFR BLD AUTO: 9.1 %
NEUTROPHILS # BLD AUTO: 7.9 X10(3)/MCL (ref 2.1–9.2)
NEUTROPHILS NFR BLD AUTO: 74 %
NRBC BLD AUTO-RTO: 0 %
PHOSPHATE SERPL-MCNC: 2 MG/DL (ref 2.3–4.7)
PLATELET # BLD AUTO: 328 X10(3)/MCL (ref 130–400)
PMV BLD AUTO: 9.6 FL (ref 7.4–10.4)
POTASSIUM SERPL-SCNC: 4.1 MMOL/L (ref 3.5–5.1)
PROT SERPL-MCNC: 5.2 GM/DL (ref 5.8–7.6)
RBC # BLD AUTO: 4.51 X10(6)/MCL (ref 4.7–6.1)
SODIUM SERPL-SCNC: 137 MMOL/L (ref 136–145)
WBC # SPEC AUTO: 10.67 X10(3)/MCL (ref 4.5–11.5)

## 2023-09-09 PROCEDURE — 84100 ASSAY OF PHOSPHORUS: CPT

## 2023-09-09 PROCEDURE — G0378 HOSPITAL OBSERVATION PER HR: HCPCS

## 2023-09-09 PROCEDURE — 96372 THER/PROPH/DIAG INJ SC/IM: CPT

## 2023-09-09 PROCEDURE — 80053 COMPREHEN METABOLIC PANEL: CPT

## 2023-09-09 PROCEDURE — 63600175 PHARM REV CODE 636 W HCPCS

## 2023-09-09 PROCEDURE — 83735 ASSAY OF MAGNESIUM: CPT

## 2023-09-09 PROCEDURE — 25000003 PHARM REV CODE 250

## 2023-09-09 PROCEDURE — 85025 COMPLETE CBC W/AUTO DIFF WBC: CPT

## 2023-09-09 RX ORDER — OXYCODONE AND ACETAMINOPHEN 10; 325 MG/1; MG/1
1 TABLET ORAL EVERY 6 HOURS PRN
Qty: 20 TABLET | Refills: 0 | Status: SHIPPED | OUTPATIENT
Start: 2023-09-09

## 2023-09-09 RX ADMIN — INSULIN ASPART 8 UNITS: 100 INJECTION, SOLUTION INTRAVENOUS; SUBCUTANEOUS at 11:09

## 2023-09-09 RX ADMIN — ACETAMINOPHEN 650 MG: 325 TABLET, FILM COATED ORAL at 06:09

## 2023-09-09 RX ADMIN — SODIUM CHLORIDE 1000 MG: 1 TABLET ORAL at 08:09

## 2023-09-09 RX ADMIN — ACETAMINOPHEN 650 MG: 325 TABLET, FILM COATED ORAL at 11:09

## 2023-09-09 RX ADMIN — SODIUM CHLORIDE: 9 INJECTION, SOLUTION INTRAVENOUS at 06:09

## 2023-09-09 RX ADMIN — PIPERACILLIN AND TAZOBACTAM 4.5 G: 4; .5 INJECTION, POWDER, LYOPHILIZED, FOR SOLUTION INTRAVENOUS; PARENTERAL at 06:09

## 2023-09-09 NOTE — PROGRESS NOTES
"   Acute Care Surgery   Progress Note  Admit Date: 9/6/2023  HD#1  POD#2 Days Post-Op    Subjective:   Interval history:  NAEON, AF, VSS  Pain improving   Tolerating regular diet w/o n/v   No BM/flatus   VANESSA with 100cc serobilious/sanguinous output     Home Meds:No current outpatient medications   Scheduled Meds:   acetaminophen  650 mg Oral Q6H    enoxparin  40 mg Subcutaneous Daily    piperacillin-tazobactam (Zosyn) IV (PEDS and ADULTS) (extended infusion is not appropriate)  4.5 g Intravenous Q8H    sodium chloride  1,000 mg Oral BID     Continuous Infusions:   sodium chloride 0.9% 100 mL/hr at 09/09/23 0626     PRN Meds:dextrose 10%, dextrose 10%, dextrose 10%, dextrose 10%, glucagon (human recombinant), glucagon (human recombinant), glucose, glucose, HYDROmorphone, insulin aspart U-100, LIDOcaine (PF) 10 mg/ml (1%), melatonin, ondansetron, oxyCODONE, oxyCODONE, sodium chloride 0.9%     Objective:     VITAL SIGNS: 24 HR MIN & MAX LAST   Temp  Min: 97.7 °F (36.5 °C)  Max: 98.9 °F (37.2 °C)  97.7 °F (36.5 °C)   BP  Min: 116/67  Max: 162/72  (!) 162/72    Pulse  Min: 73  Max: 93  78    Resp  Min: 18  Max: 18  18    SpO2  Min: 89 %  Max: 94 %  (!) 93 %      HT: 5' 6" (167.6 cm)  WT: 71.7 kg (158 lb)  BMI: 25.5     Intake/output:  Intake/Output - Last 3 Shifts         09/07 0700 09/08 0659 09/08 0700 09/09 0659 09/09 0700  09/10 0659    P.O. 1240 1420     IV Piggyback 700      Total Intake(mL/kg) 1940 (27.1) 1420 (19.8)     Urine (mL/kg/hr) 1100 (0.6) 2250 (1.3)     Drains 285 100     Stool 0      Total Output 1385 2350     Net +555 -930            Stool Occurrence 0 x              Intake/Output Summary (Last 24 hours) at 9/9/2023 0716  Last data filed at 9/9/2023 0511  Gross per 24 hour   Intake 1420 ml   Output 2350 ml   Net -930 ml             Lines/drains/airway:       Peripheral IV - Single Lumen 09/06/23 0242 20 G Anterior;Distal;Left Upper Arm (Active)   Site Assessment Clean;Dry;Intact;No redness;No swelling " "09/08/23 0800   Extremity Assessment Distal to IV No abnormal discoloration 09/08/23 0800   Line Status Infusing 09/08/23 0800   Dressing Status Clean;Dry;Intact 09/08/23 0800   Dressing Intervention Integrity maintained 09/08/23 0800   Number of days: 2            Peripheral IV - Single Lumen 09/06/23 1259 20 G Anterior;Right Forearm (Active)   Site Assessment Clean;Dry;Intact;No redness;No swelling 09/08/23 0800   Extremity Assessment Distal to IV No abnormal discoloration 09/08/23 0800   Line Status Saline locked 09/08/23 0800   Dressing Status Dry;Clean;Intact 09/08/23 0800   Dressing Intervention Integrity maintained 09/08/23 0800   Number of days: 2            Closed/Suction Drain 09/07/23 1032 Lateral RUQ 14 Fr. (Active)   Site Description Leaking at site 09/08/23 0800   Dressing Type Transparent (Tegaderm) 09/08/23 0800   Dressing Status New drainage 09/08/23 0800   Dressing Intervention Sterile dressing change 09/08/23 0800   Drainage Serosanguineous 09/08/23 0800   Status To bulb suction 09/08/23 0800   Output (mL) 25 mL 09/07/23 2300   Number of days: 1       Physical examination:  Gen: NAD, AAOx3, answering questions appropriately  HEENT: EOMI, MMM  CV: RR  Resp: NWOB  Abd: Soft, nondistended, moderate TTP; laparoscopic incision sites c/d/I, VANESSA with bilio-sanguinous output  Ext: moving all extremities spontaneously and purposefully  Neuro: CN II-XII grossly intact    Labs:  Renal:  Recent Labs     09/07/23 0358 09/08/23 0355 09/09/23 0413   BUN 19.5 17.7 11.5   CREATININE 1.09 0.86 0.76       No results for input(s): "LACTIC" in the last 72 hours.    FENGI:  Recent Labs     09/07/23 0358 09/08/23 0355 09/09/23 0413   * 133* 137   K 4.6 4.1 4.1   CO2 24 24 22*   CALCIUM 8.3* 7.5* 7.6*   MG 1.80 2.20 2.00   PHOS 2.8 2.1* 2.0*   ALBUMIN 2.5* 2.1* 2.0*   BILITOT 3.2* 1.7* 1.3   AST 30 40* 34   ALKPHOS 185* 180* 229*   ALT 24 33 29       Heme:  Recent Labs     09/07/23  0358 09/08/23  0355 " "09/09/23 0413   HGB 13.7* 12.8* 13.8*   HCT 38.6* 36.3* 39.6*    290 328       ID:  Recent Labs     09/07/23  0358 09/08/23 0355 09/09/23 0413   WBC 27.90* 15.46* 10.67       CBG:  Recent Labs     09/07/23 0358 09/08/23 0355 09/09/23 0413   GLUCOSE 290* 222* 145*        Cardiovascular:  Recent Labs   Lab 09/06/23  0249   TROPONINI <0.010       ABG:  No results for input(s): "PH", "PO2", "PCO2", "HCO3", "BE" in the last 168 hours.   I have reviewed all pertinent lab results within the past 24 hours.    Imaging:  US Abdomen Limited_Gallbladder   Final Result      As above.  Findings concerning for acute cholecystitis.         Electronically signed by: Luis Fernando Hoyt   Date:    09/06/2023   Time:    07:04      CT Abdomen Pelvis With Contrast   Final Result      1. Gallbladder distension with pericholecystic inflammatory stranding suspicious for acute cholecystitis.   2. Trace ascites.   3. Trace right pleural effusion and bibasilar atelectasis.   [facility] concordance         Electronically signed by: Dinesh Ladd MD   Date:    09/06/2023   Time:    07:30         I have reviewed all pertinent imaging results/findings within the past 24 hours.    Micro/Path/Other:  Microbiology Results (last 7 days)       Procedure Component Value Units Date/Time    Blood Culture #1 **CANNOT BE ORDERED STAT** [[id number]]  (Normal) Collected: 09/06/23 0345    Order Status: Completed Specimen: Blood Updated: 09/08/23 0900     CULTURE, BLOOD ([facility]) No Growth At 48 Hours    Blood Culture #2 **CANNOT BE ORDERED STAT** [[id number]]  (Normal) Collected: 09/06/23 0345    Order Status: Completed Specimen: Blood Updated: 09/08/23 0900     CULTURE, BLOOD ([facility]) No Growth At 48 Hours           Specimen (168h ago, onward)       Start     Ordered    09/07/23 0944  Specimen to Pathology  RELEASE UPON ORDERING        References:    Click here for ordering Quick Tip   Question:  Release to patient  Answer:  Immediate    09/07/23 0944         "           Assessment & Plan:   Patient is a 73 year old male with history HTN, DM who presented 9/6 with acute onset RUQ abdominal pain found to have acute cholecystitis on RUQ US sow s/p laparoscopic cholecystectomy 9/7.    -regular diet  -Continue zosyn. Will discharge on PO abx   -VANESSA to bulb suction   -MMPC  -Lovenox for DVT ppx    Amy Collins MD   [employer] General Surgery PGY 4

## 2023-09-09 NOTE — NURSING
Discharge instructions delivered to pt and family times three     TERRELL Drain care performed and education with understanding stated     Drainage clear in color no odor noted to terrell insertion site, MD is aware of this drainage to the site.     Pt to be discharged with TERRELL and follow up with MD     Lap sites times four are noted to be clean ands intact with steri steps in place

## 2023-09-09 NOTE — DISCHARGE SUMMARY
[facility] - 8th Floor Med Surg  General Surgery  Discharge Summary      Patient Name: Kishore Anderson  MRN: [id number]  Admission Date: 9/6/2023  Hospital Length of Stay: 1 days  Discharge Date and Time:  09/09/2023 10:37 AM  Attending Physician: Evan Santana MD   Discharging Provider: Amy Collins MD  Primary Care Provider: Michelle, Primary Doctor     HPI:   Patient is a 73-year-old male with history of hypertension and diabetes who presented to the ED on 09/06 with acute onset right upper quadrant abdominal pain and found to have acute gangrenous cholecystitis.    Procedure(s) (LRB):  CHOLECYSTECTOMY, LAPAROSCOPIC (N/A)     Hospital Course:   Patient was admitted to the surgical service for antibiotics, and underwent laparoscopic cholecystectomy on 09/07/2023.  A VANESSA drain was left intraoperatively.  The patient's postoperative course was uneventful.  He was able to tolerate a diet, have pain control oral pain medication, void, and ambulate independently.  His VANESSA drain continued to have approximately 100 cc of serosanguineous output daily and then decision was made to leave the drain in place on discharge.  He was discharged home on 09/09/2023 in stable condition.  Prior to discharge all questions were answered, he was taught wound and drain care, given return precautions, and understood the importance of clinic follow-up for evaluation of drain removal.  He was discharged home in stable condition.    Consults:   None    Significant Diagnostic Studies: N/A    Pending Diagnostic Studies:       None          Final Active Diagnoses:    Diagnosis Date Noted POA    PRINCIPAL PROBLEM:  Acute cholecystitis [K81.0] 09/06/2023 Yes      Problems Resolved During this Admission:      Discharged Condition: good    Disposition:   Home with self-care and family    Follow Up:   Follow-up Information       Surgery, [facility] Acute Care Follow up.    Why: Doctor will call you with a telemed visit on 9/19/23.  Please call with any questions or concerns  Contact information:  [address] [place] [id number]  [phone]                           Patient Instructions:   No discharge procedures on file.  Medications:  Reconciled Home Medications:      Medication List        START taking these medications      oxyCODONE-acetaminophen  mg per tablet  Commonly known as: PERCOCET  Take 1 tablet by mouth every 6 (six) hours as needed for Pain.              Amy Collins MD  General Surgery  [facility] - 8th Floor Med Surg

## 2023-09-10 LAB — POCT GLUCOSE: 303 MG/DL (ref 70–110)

## 2023-09-11 LAB
BACTERIA BLD CULT: NORMAL
BACTERIA BLD CULT: NORMAL

## 2023-09-19 ENCOUNTER — OFFICE VISIT (OUTPATIENT)
Dept: SURGERY | Facility: CLINIC | Age: 73
End: 2023-09-19

## 2023-09-19 DIAGNOSIS — Z09 POSTOP CHECK: Primary | ICD-10-CM

## 2023-09-19 PROCEDURE — 99499 NO LOS: ICD-10-PCS | Mod: ,,, | Performed by: SURGERY

## 2023-09-19 PROCEDURE — 99499 UNLISTED E&M SERVICE: CPT | Mod: ,,, | Performed by: SURGERY

## 2023-09-19 NOTE — PROGRESS NOTES
HPI:   Patient is a 73 y.o. M who is here for follow-up s/p anila alonzo on 9/7/23. Patient still has a drain in place with minimal to no drainage. Patient denies any pain, fever, n/v, or problems voiding. He is from [facility] and will be returning to his home in about 1 week.     PE:   Steri strips in place over laparoscopic incisions. Healing well. No drainage or erythema. Drain was removed.       Plan:   -Patient is doing well post-operatively  -Drain and steri strips were removed  -No heavy lifting for 5 weeks post-operatively  -Ok to shower with soap and water  -Patient can follow-up as needed     Uriel Lynch MD  General Surgery

## (undated) DEVICE — TROCAR ENDOPATH XCEL 11MM 10CM

## (undated) DEVICE — RESERVOIR JACKSON-PRATT 100CC

## (undated) DEVICE — BAG SPEC RETRV ENDO 4X6IN DISP

## (undated) DEVICE — CLOSURE SKIN STERI STRIP 1/2X4

## (undated) DEVICE — SUT ENDOLOOP PDSII 18 LIGA

## (undated) DEVICE — CHLORAPREP W TINT 26ML APPL

## (undated) DEVICE — SOL IRRI STRL WATER 1000ML

## (undated) DEVICE — SUT VICRYL PLUS 4-0 PS2 27

## (undated) DEVICE — SYR 10CC LUER LOCK

## (undated) DEVICE — SCISSOR CURVED ENDOPATH 5MM

## (undated) DEVICE — CANNULA ENDOPATH XCEL 5X100MM

## (undated) DEVICE — IRRIGATOR HYDRO-SURG PLUS 5MM

## (undated) DEVICE — WARMER DRAPE STERILE LF

## (undated) DEVICE — CORD LAP 10 DISP

## (undated) DEVICE — NDL HYPO REG 25G X 1 1/2

## (undated) DEVICE — ELECTRODE PATIENT RETURN DISP

## (undated) DEVICE — KIT SURGICAL TURNOVER

## (undated) DEVICE — NDL HYPO 22GX1 1/2 SYR 10ML LL

## (undated) DEVICE — DRESSING TELFA N ADH 3X8IN

## (undated) DEVICE — TROCAR ENDOPATH XCEL 5X100MM

## (undated) DEVICE — SUT MCRYL PLUS 4-0 PS2 27IN

## (undated) DEVICE — DRAIN SURG HUBLESS 30CM 15FR

## (undated) DEVICE — TROCAR ENDOPATH XCEL 11X100MM

## (undated) DEVICE — SYS SMOKE EVACUATION LAP

## (undated) DEVICE — SUT VICRYL+ 27 UR-6 VIOL

## (undated) DEVICE — GLOVE PROTEXIS HYDROGEL SZ7.5

## (undated) DEVICE — SUT SILK 2.0 BLK 18

## (undated) DEVICE — HEMOSTAT SURGICEL FIBRLR 2X4IN

## (undated) DEVICE — KIT GEN LAPAROSCOPY LAFAYETTE

## (undated) DEVICE — SUPPORT ULNA NERVE PROTECTOR

## (undated) DEVICE — APPLIER CLIP ENDO LIGAMAX 5MM